# Patient Record
Sex: FEMALE | Race: WHITE | Employment: OTHER | URBAN - METROPOLITAN AREA
[De-identification: names, ages, dates, MRNs, and addresses within clinical notes are randomized per-mention and may not be internally consistent; named-entity substitution may affect disease eponyms.]

---

## 2017-08-15 ENCOUNTER — HOSPITAL ENCOUNTER (OUTPATIENT)
Dept: LAB | Age: 72
Discharge: HOME | End: 2017-08-15

## 2017-08-15 PROCEDURE — 88305 TISSUE EXAM BY PATHOLOGIST: CPT | Performed by: INTERNAL MEDICINE

## 2017-08-16 ENCOUNTER — HOSPITAL ENCOUNTER (OUTPATIENT)
Dept: LAB | Age: 72
Discharge: HOME | End: 2017-08-16

## 2018-12-12 PROBLEM — E04.1 THYROID NODULE: Status: ACTIVE | Noted: 2018-12-12

## 2018-12-14 ENCOUNTER — HOSPITAL ENCOUNTER (EMERGENCY)
Age: 73
Discharge: HOME | End: 2018-12-14
Attending: EMERGENCY MEDICINE
Payer: MEDICARE

## 2018-12-14 VITALS
TEMPERATURE: 98.1 F | RESPIRATION RATE: 20 BRPM | DIASTOLIC BLOOD PRESSURE: 90 MMHG | OXYGEN SATURATION: 99 % | HEART RATE: 95 BPM | SYSTOLIC BLOOD PRESSURE: 145 MMHG

## 2018-12-14 DIAGNOSIS — F41.9 ACUTE ANXIETY: Primary | ICD-10-CM

## 2018-12-14 PROCEDURE — 74011250637 HC RX REV CODE- 250/637: Performed by: EMERGENCY MEDICINE

## 2018-12-14 PROCEDURE — 99284 EMERGENCY DEPT VISIT MOD MDM: CPT | Performed by: EMERGENCY MEDICINE

## 2018-12-14 RX ORDER — LORAZEPAM 0.5 MG/1
0.5 TABLET ORAL
Qty: 11 TAB | Refills: 0 | Status: SHIPPED | OUTPATIENT
Start: 2018-12-14 | End: 2019-07-23 | Stop reason: ALTCHOICE

## 2018-12-14 RX ORDER — LORAZEPAM 0.5 MG/1
0.5 TABLET ORAL
Status: COMPLETED | OUTPATIENT
Start: 2018-12-14 | End: 2018-12-14

## 2018-12-14 RX ADMIN — LORAZEPAM 0.5 MG: 0.5 TABLET ORAL at 14:54

## 2018-12-14 NOTE — ED NOTES
I have reviewed discharge instructions with the patient. The patient verbalized understanding. Patient left ED via Discharge Method: ambulatory to Home with family. Opportunity for questions and clarification provided. Patient given 1 scripts. To continue your aftercare when you leave the hospital, you may receive an automated call from our care team to check in on how you are doing. This is a free service and part of our promise to provide the best care and service to meet your aftercare needs.  If you have questions, or wish to unsubscribe from this service please call [phone]. Thank you for Choosing our [facility].

## 2018-12-14 NOTE — PROGRESS NOTES
Yue Polanco called from [facility] stating their system came back up. Appointment made for Monday 12/17 @ 2:30. Called patient and let her know - states she knows where it is. Suzy OLEA notified.

## 2018-12-14 NOTE — ED TRIAGE NOTES
Pt states she is extremely anxious and hasn't been sleeping at night. Pt states she started having this issue about 5 weeks ago and was put on hydroxyzine jolly 25mg TID as needed. Pt trying to get into psychiatrist. Denies wanting to harm herself. Wants anxiety relief and wants sleep.

## 2018-12-14 NOTE — DISCHARGE INSTRUCTIONS
Take medications as needed  Ativan may make you groggy/drowsy. You should not drive, operate machinery, or partake in any dangerous activities if you have taken Ativan in the last 6 hours  Follow-up with your regular medical doctor  Follow-up with psychiatry as soon as possible    Return to ER for any worsening symptoms or new problems which may arise    Please call Estrella Spangler at [phone] if you have not heard back from [facility] by the end of next week. !

## 2018-12-14 NOTE — ED PROVIDER NOTES
78-year-old female presents with progressive anxiety and depression  Ongoing issue for many weeks. Her family doctor put her on antidepressants as well as Vistaril  She presents today that she feels no better  Family members have attempted to get the patient set up for outpatient psychiatric care. However, they have not been able to establish an appointment for her at this time. The history is provided by the patient and a relative. Anxiety    This is a recurrent problem. The current episode started more than 1 week ago. The problem has been gradually worsening. The problem occurs constantly. The pain is associated with stress. The patient is experiencing no pain. The symptoms are aggravated by stress. Associated symptoms include malaise/fatigue. Pertinent negatives include no abdominal pain, no cough, no fever, no headaches, no palpitations, no shortness of breath and no vomiting. Treatments tried: Vistaril. The treatment provided no relief. Insomnia    Pertinent negatives include no confusion. Past Medical History:   Diagnosis Date    Anxiety     Diabetes [facility])        Past Surgical History:   Procedure Laterality Date    HX COLONOSCOPY      HX ENDOSCOPY           History reviewed. No pertinent family history.     Social History     Socioeconomic History    Marital status:      Spouse name: Not on file    Number of children: Not on file    Years of education: Not on file    Highest education level: Not on file   Social Needs    Financial resource strain: Not on file    Food insecurity - worry: Not on file    Food insecurity - inability: Not on file    Transportation needs - medical: Not on file   [employer] needs - non-medical: Not on file   Occupational History    Not on file   Tobacco Use    Smoking status: Never Smoker    Smokeless tobacco: Never Used   Substance and Sexual Activity    Alcohol use: Not on file    Drug use: Not on file    Sexual activity: Not on file   Other Topics Concern    Not on file   Social History Narrative    Not on file         ALLERGIES: Patient has no known allergies. Review of Systems   Constitutional: Positive for fatigue and malaise/fatigue. Negative for chills and fever. HENT: Negative for congestion, ear pain and rhinorrhea. Eyes: Negative for photophobia and discharge. Respiratory: Negative for cough and shortness of breath. Cardiovascular: Negative for chest pain and palpitations. Gastrointestinal: Negative for abdominal pain, constipation, diarrhea and vomiting. Endocrine: Negative for cold intolerance and heat intolerance. Genitourinary: Negative for dysuria and flank pain. Musculoskeletal: Negative for arthralgias, myalgias and neck pain. Skin: Negative for rash and wound. Allergic/Immunologic: Negative for environmental allergies and food allergies. Neurological: Negative for syncope and headaches. Hematological: Negative for adenopathy. Does not bruise/bleed easily. Psychiatric/Behavioral: Positive for dysphoric mood. Negative for behavioral problems and confusion. The patient is nervous/anxious and has insomnia. All other systems reviewed and are negative. Vitals:    12/14/18 1313   BP: (!) 156/91   Pulse: (!) 108   Resp: 20   Temp: 97.9 °F (36.6 °C)   SpO2: 97%            Physical Exam   Constitutional: She is oriented to person, place, and time. She appears well-developed and well-nourished. She appears distressed. HENT:   Head: Normocephalic and atraumatic. Mouth/Throat: Oropharynx is clear and moist. No oropharyngeal exudate. Eyes: Conjunctivae and EOM are normal. Pupils are equal, round, and reactive to light. Neck: Normal range of motion. Neck supple. No JVD present. Cardiovascular: Normal rate, regular rhythm, normal heart sounds and intact distal pulses. Exam reveals no gallop and no friction rub. No murmur heard.   Pulmonary/Chest: Effort normal and breath sounds normal. Abdominal: Soft. Normal appearance and bowel sounds are normal. She exhibits no distension and no mass. There is no hepatosplenomegaly. There is no tenderness. Musculoskeletal: Normal range of motion. She exhibits no edema or deformity. Neurological: She is alert and oriented to person, place, and time. She has normal strength. No cranial nerve deficit or sensory deficit. She displays a negative Romberg sign. Gait normal.   Skin: Skin is warm and dry. Capillary refill takes less than 2 seconds. No rash noted. Psychiatric: Her speech is normal and behavior is normal. Judgment and thought content normal. Her mood appears anxious. Cognition and memory are normal.   Nursing note and vitals reviewed. MDM  Number of Diagnoses or Management Options  Acute anxiety: established and worsening  Diagnosis management comments: 78-year-old female with anxiety and depression  No suicidal or homicidal ideations  I will start the patient on a low-dose of Ativan. We will begin the process of a referral to [facility] psychiatry  Patient and family are strongly encouraged to pursue their other referrals as well, so that she can establish with a regular psychiatrist.           Amount and/or Complexity of Data Reviewed  Tests in the medicine section of CPT®: ordered and reviewed  Review and summarize past medical records: yes    Risk of Complications, Morbidity, and/or Mortality  Presenting problems: moderate  Diagnostic procedures: minimal  Management options: low  General comments: Elements of this note have been dictated via voice recognition software. Text and phrases may be limited by the accuracy of the software. The chart has been reviewed, but errors may still be present.       Patient Progress  Patient progress: stable         Procedures

## 2018-12-17 ENCOUNTER — HOSPITAL ENCOUNTER (OUTPATIENT)
Dept: ULTRASOUND IMAGING | Age: 73
Discharge: HOME | End: 2018-12-17
Attending: SURGERY
Payer: MEDICARE

## 2018-12-17 VITALS
SYSTOLIC BLOOD PRESSURE: 161 MMHG | RESPIRATION RATE: 16 BRPM | OXYGEN SATURATION: 97 % | HEART RATE: 107 BPM | DIASTOLIC BLOOD PRESSURE: 75 MMHG

## 2018-12-17 DIAGNOSIS — E04.1 THYROID NODULE: ICD-10-CM

## 2018-12-17 PROCEDURE — 88173 CYTOPATH EVAL FNA REPORT: CPT

## 2018-12-17 PROCEDURE — 10022 US GUIDE FINE NDL ASP THYROID: CPT

## 2018-12-17 PROCEDURE — 88305 TISSUE EXAM BY PATHOLOGIST: CPT

## 2018-12-17 PROCEDURE — 74011250636 HC RX REV CODE- 250/636: Performed by: PHYSICIAN ASSISTANT

## 2018-12-17 RX ORDER — LIDOCAINE HYDROCHLORIDE 20 MG/ML
20-200 INJECTION, SOLUTION EPIDURAL; INFILTRATION; INTRACAUDAL; PERINEURAL ONCE
Status: COMPLETED | OUTPATIENT
Start: 2018-12-17 | End: 2018-12-17

## 2018-12-17 RX ADMIN — LIDOCAINE HYDROCHLORIDE 40 MG: 20 INJECTION, SOLUTION EPIDURAL; INFILTRATION; INTRACAUDAL; PERINEURAL at 12:55

## 2018-12-17 NOTE — PROGRESS NOTES
Pt in [address] for procedure; pt hungry, given crackers and peanut butter prior to procedure.

## 2018-12-17 NOTE — DISCHARGE INSTRUCTIONS
[address]  Department of Interventional Radiology  [facility]  [phone] Office  [phone] Fax    BIOPSY DISCHARGE INSTRUCTIONS    General Instructions:     A biopsy is the removal of a small piece of tissue for microscopic examination or testing. Healthy tissue can be obtained for the purpose of tissue-type matching for transplants. Unhealthy tissues are more commonly biopsied to diagnose disease. General Biopsy:     A mass can grow in any area of the body, and we are taking a specimen as ordered by your doctor. The risks are the same. They include bleeding, pain, and infection. Home Care Instructions: You may resume your regular diet and medication regimen. Do not lift anything heavier than a gallon of milk until the soreness goes away. You may use over the counter acetaminophen or ibuprofen for the soreness. You may apply an ice pack to the affected area for 20-30 minutes at time for the first 24 hours. After that, you may apply a heat pack. Call If: You should call your Physician and/or the Radiology Nurse if you have any questions or concerns about the biopsy site. Call if you should have increased pain, fever, redness, drainage, or bleeding more than a small spot on the bandage. Follow-Up Instructions: Please see your ordering doctor as he/she has requested. To Reach Us: If you have any questions about your procedure, please call the Interventional Radiology department at [phone]. After business hours (5pm) and weekends, call the answering service at [phone] and ask for the Radiologist on call to be paged. Interventional Radiology General Nurse Discharge    * Please give a list of your current medications to your Primary Care Provider.   * Please update this list whenever your medications are discontinued, doses are     changed, or new medications (including over-the-counter products) are added.  * Please carry medication information at all times in case of emergency situations. These are general instructions for a healthy lifestyle:    No smoking/ No tobacco products/ Avoid exposure to second hand smoke  Surgeon General's Warning:  Quitting smoking now greatly reduces serious risk to your health. Obesity, smoking, and sedentary lifestyle greatly increases your risk for illness  A healthy diet, regular physical exercise & weight monitoring are important for maintaining a healthy lifestyle    You may be retaining fluid if you have a history of heart failure or if you experience any of the following symptoms:  Weight gain of 3 pounds or more overnight or 5 pounds in a week, increased swelling in our hands or feet or shortness of breath while lying flat in bed. Please call your doctor as soon as you notice any of these symptoms; do not wait until your next office visit. Recognize signs and symptoms of STROKE:  F-face looks uneven    A-arms unable to move or move unevenly    S-speech slurred or non-existent    T-time-call 911 as soon as signs and symptoms begin-DO NOT go       Back to bed or wait to see if you get better-TIME IS BRAIN.           Patient Signature:  Date: 12/17/2018  Discharging Nurse: Naun Soto

## 2018-12-20 NOTE — PROGRESS NOTES
Patient called stating she would like to go to [facility].   Email communication with Silvana Umanzor at [facility] to let her know and records faxed for referral.

## 2019-01-08 PROBLEM — E04.1 THYROID NODULE: Status: RESOLVED | Noted: 2018-12-12 | Resolved: 2019-01-08

## 2019-12-27 PROBLEM — R00.2 PALPITATIONS: Status: ACTIVE | Noted: 2019-12-27

## 2019-12-27 PROBLEM — E11.9 CONTROLLED TYPE 2 DIABETES MELLITUS WITHOUT COMPLICATION, WITHOUT LONG-TERM CURRENT USE OF INSULIN (HCC): Status: ACTIVE | Noted: 2019-12-27

## 2020-01-17 PROBLEM — I47.1 SVT (SUPRAVENTRICULAR TACHYCARDIA) (HCC): Status: ACTIVE | Noted: 2020-01-17

## 2020-11-12 ENCOUNTER — HOSPITAL ENCOUNTER (OUTPATIENT)
Dept: LAB | Age: 75
Discharge: HOME | End: 2020-11-12

## 2020-11-12 PROCEDURE — 88305 TISSUE EXAM BY PATHOLOGIST: CPT

## 2022-03-18 PROBLEM — R00.2 PALPITATIONS: Status: ACTIVE | Noted: 2019-12-27

## 2022-03-18 PROBLEM — E11.9 CONTROLLED TYPE 2 DIABETES MELLITUS WITHOUT COMPLICATION, WITHOUT LONG-TERM CURRENT USE OF INSULIN (HCC): Status: ACTIVE | Noted: 2019-12-27

## 2022-03-19 PROBLEM — I47.1 SVT (SUPRAVENTRICULAR TACHYCARDIA) (HCC): Status: ACTIVE | Noted: 2020-01-17

## 2022-03-19 PROBLEM — I47.10 SVT (SUPRAVENTRICULAR TACHYCARDIA): Status: ACTIVE | Noted: 2020-01-17

## 2022-07-08 ENCOUNTER — OFFICE VISIT (OUTPATIENT)
Dept: ORTHOPEDIC SURGERY | Age: 77
End: 2022-07-08
Payer: MEDICARE

## 2022-07-08 VITALS — HEIGHT: 66 IN | BODY MASS INDEX: 27.97 KG/M2 | WEIGHT: 174 LBS

## 2022-07-08 DIAGNOSIS — M25.511 BILATERAL SHOULDER PAIN, UNSPECIFIED CHRONICITY: Primary | ICD-10-CM

## 2022-07-08 DIAGNOSIS — M25.512 BILATERAL SHOULDER PAIN, UNSPECIFIED CHRONICITY: Primary | ICD-10-CM

## 2022-07-08 DIAGNOSIS — M12.811 ROTATOR CUFF ARTHROPATHY OF BOTH SHOULDERS: ICD-10-CM

## 2022-07-08 DIAGNOSIS — M12.812 ROTATOR CUFF ARTHROPATHY OF BOTH SHOULDERS: ICD-10-CM

## 2022-07-08 RX ORDER — METHYLPREDNISOLONE ACETATE 40 MG/ML
40 INJECTION, SUSPENSION INTRA-ARTICULAR; INTRALESIONAL; INTRAMUSCULAR; SOFT TISSUE ONCE
Status: COMPLETED | OUTPATIENT
Start: 2022-07-08 | End: 2022-07-08

## 2022-07-08 RX ORDER — OMEPRAZOLE 20 MG/1
CAPSULE, DELAYED RELEASE ORAL
COMMUNITY
Start: 2022-05-13

## 2022-07-08 RX ADMIN — METHYLPREDNISOLONE ACETATE 80 MG: 40 INJECTION, SUSPENSION INTRA-ARTICULAR; INTRALESIONAL; INTRAMUSCULAR; SOFT TISSUE at 16:03

## 2022-07-08 NOTE — PROGRESS NOTES
Patient was fitted and instructed on a Silicone Toe Sleeve for the right foot. Patient read and signed documenting they understand and agree to [facility]'s current DME return policy.

## 2022-07-08 NOTE — PROGRESS NOTES
Name: Shahida Ireland  YOB: 1945  Gender: female  MRN: [id number]    CC: Right and Left shoulder pain: Back pain: Second toe: Foot fungus     HPI:   She reports going upstairs, tripping and bracing herself with her arms extended. From that point she noticed pain in her shoulders/arms    02/18/2022: Shoulder injections really helped her pain  05/20/2022: Last visit with me. She did really well and reports no glucose concerns   07/08/2022: She presents early to discuss several concerns. 1.  Chronic low back pain. She reports having injections by Dr. Marti Stephenson  2. Foot fungus  3. Right second claw toe  4. Recurrent shoulder pain     ROS/Meds/PSH/PMH/FH/SH: reviewed today    Tobacco:  reports that she has quit smoking. She has never used smokeless tobacco.      Physical Examination:  Patient appears to be alert and oriented with acceptable appearance.   No obvious distress or SOB  CV: appears to have acceptable vascular color and capillary refill  Neuro: appears to have mostly intact light touch sensation   Skin: No soft tissue swelling or bruising: Second toe PIP thickening but no ulceration or redness; thick nails but no web-space moister   MS: Standing: Plantigrade: Gait full: Right second claw toe  Neck = no localizing signs of neck pain    Right and left shoulders/arms = exam consistent with subacromial impingement/rotator cuff; she has full motion of her arms without any pain as long as she does not abduct beyond 80 or into extreme forward flexion or extension  Right and left shoulders/arms = no evidence of biceps tearing; 5/5 strength; good shoulder elbow wrist and finger motion; no gross instability     XR: Left and right humerus taken 02/18/2022 with no acute pathology appreciated; AC joint arthritis;  XR Impression:  As above       XR: Lumbosacral spine films with spot lumbosacral film taken 02/18/2022 with degenerative arthritis; facet arthritis; mild spondylolisthesis; no acute pathology; linear calcifications suspected to be within the aorta  XR Impression:  As above      Injection: We discussed the risks and complications of the procedure and the decision was made to proceed; after sterile prep, EACH right and left shoulder was injected with 2 cc Xylocaine: 80 mg Depo-Medrol: she understands how the Depomedrol may affect glucose control. She desired to proceed with the injection and agrees to monitor, treat and control the glucose level; She did well after the injection     Assessment:     Right shoulder pain, rotator cuff arthropathy, glenohumeral arthritis  Left shoulder pain, rotator cuff arthropathy, glenohumeral arthritis  Right second claw toe  Chronic low back pain under the care of Dr. Cherylene Landau:   The patient and I discussed the above assessment. We explored treatment options. She had questions about her low back pain, but I recommend she direct all spine related questions to Dr. Cynthia Abreu  She had questions about foot fungus and I recommend she seek dermatologic treatment   She had questions about her right second toe claw toe. She was issued a toes sleeve but we discussed future claw toe surgery  She received excellent results after her last shoulder injection and requested another injection today. She is early for repeat injections but I did consent to repeat injections.         Advanced medical imaging: Future possible MRI scan right shoulder: Left shoulder  Future possible consultation: Dr. Imelda Wayne for left shoulder and right shoulder     We discussed shoulder/arm care and protection      Medication - OTC meds prn; as discussed prior prescribed:  Magne sports topical rub with frankincense and myrrh   Alpha Lipoic acid, Boswellia, Devil's claw, Turmeric-curcumin        Surgical discussion: Surgery per Dr. Imelda Wayne   Follow up: Dr. Imelda Wayne: Dr. Cynthia Abreu    Work status: Limited     This note was created using Dragon voice recognition software which may result in errors of speech and spelling recognition and word/phrase syntax errors.

## 2022-07-08 NOTE — LETTER
DME Patient Authorization Form    Name: Nancy Jiang  :   MRN: [id number]   Age: 77 y.o. Gender: female  Delivery Address: [facility]     Diagnosis:     ICD-10-CM    1. Bilateral shoulder pain, unspecified chronicity  C85.781 Silicone Toe Sleeve ()    M25.512         Requested DME:  Silicone Toe Sleeve -  ($5) X 1 - right        Clinical Notes:     **Indicates non-covered items by insurance. Payment expected on date of service. Electronically signed by  Provider: __Sergey Cortes MD_____________________________ Date: 2022                             [facility] Tax ID # [id number]        Durable Medical Equipment and/or Orthotics Patient Consent     I understand that my physician has prescribed this medical supply as part of my treatment plan as a matter of Medical Necessity.  I understand that I have a choice in where I receive my prescribed orthopedic supplies and/or services.  I authorize [facility] to furnish this service/product and to provide my insurance carrier with any information requested in order to process for payment.  I instruct my insurance carrier to pay [facility] directly for these services/products.  I understand that my insurance carrier may deny payment for this supply because it is a non-covered item, deemed not medically necessary or considered experimental.   I understand that any cost not covered by my insurance carrier will be solely my financial responsibility.  I have received the Supplier Standards and have reviewed them.  I have received the prescribed item and have been fully instructed on the proper use of the above services/products.    ______ (Patient Initials) I understand that all DME items are non-returnable after being dispensed.  Items still in sealed packaging may be returned up to 14 days after purchasing. [address] will replace items that are defective.    ______ (Patient Initials) I understand that [facility] Haley will not file a claim with my insurance carrier for this service/product and I am waiving my right to file a claim on my own for this service/product with my insurance company as this item is NON-COVERED (Denoted by the **) by my Insurance company/policy. ______ (Patient Initials) I understand that I am responsible to bring my equipment to the hospital for any surgery. ______________________________________________  ________________________  Patient / Carmen Prime            Thank you for considering [address] Your physician has prescribed specific medical equipment or devices for your home use. The following describes your rights and responsibilities as our customer. Right to Choose Providers: You have a choice regarding which company supplies your home medical equipment and devices, and to consult your physician in this decision. You may choose a medical supply store, a home medical equipment provider, or a specialist such as [facility]. [facility] will coordinate with your physician to provide the medical equipment or devices prescribed for your home use. Right to Service:  You have the right to considerate, respectful and nondiscriminatory care. You have the right to receive accurate and easily understood information about your health care. If you speak a foreign language, or don't understand the discussions, assistance will be provided to allow you to make informed health care decisions. You have the right to know your treatment options and to participate in decisions about your care, including the right to accept or refuse treatment.   You have the right to expect a reasonable response to your requests for treatment or service. You have the right to talk in confidence with health care providers and to have your health care information protected. You have the right to receive an explanation of your bill. You have the right to complain about the service or product you receive. Patient Responsibilities:  Please provide complete and accurate information about your health insurance benefits and make arrangements for the timely payment of your bill. [facility] will, if possible, assume responsibility for billing your insurance (Medicare, Medicaid and commercial) for the prescribed equipment or devices. If your policy does not cover the prescribed product, or only covers a portion of the bill, you are responsible for any remaining balance. Return and Exchange Policy:  [facility] will honor published  Warranties for products. [facility] will accept returns or exchanges within 14 days from the date of receipt, providin) the product must be in new condition; 2) receipt as required; and 3) used disposable and hygiene products may only be returned due to a defective product. Note: Refunds will be issued in a timely manner, please allow 4-6 weeks for processing. Complaint Procedures and DME Consumer Protection Resources:  [facility] values you as a customer, and is committed to resolving patient concerns. This commitment includes understanding and documenting your concerns, conducting a review of internal procedures, and providing you with an explanation and resolution to your concerns. Should you have any questions about our services or billing process, please contact our office at (practice phone number). If we are unable to resolve the concern, you have the right to direct comments to the office of Consumer Protection, in the [address] or the [facility] office, without fear of repercussion.     DMEPOS SUPPLIER STANDARDS    A supplier must be in compliance with all applicable Federal and State licensure and regulatory requirements. A supplier must provide complete and accurate information on the DMEPOS supplier application. Any changes to this information must be reported to the [facility] within 30 days. An authorized individual (one whose signature is binding) must sign the application for billing privileges. A supplier must fill orders from its own inventory, or must contract with other companies for the purchase of items necessary to fill the order. A supplier may not contract with any entity that is currently excluded from the Medicare program, any [facility] program, or from any other Federal procurement or Nonprocurement programs. A supplier must advise beneficiaries that they may rent or purchase inexpensive or routinely purchased durable medical equipment, and of the purchase option for capped rental equipment. A supplier must notify beneficiaries of warranty coverage and honor all warranties under applicable State Law, and repair or replace free of charge Medicare covered items that are under warranty. A supplier must maintain a physical facility on an appropriate site. A supplier must permit CMS, or its agents to conduct on-site inspections to ascertain the supplier's compliance with these standards. The supplier location must be accessible to beneficiaries during reasonable business hours, and must maintain a visible sign and posted hours of operation. A supplier must maintain a primary business telephone listed under the name of the business in a [employer] or a toll free number available through directory assistance. The exclusive use of a beeper, answering machine or cell phone is prohibited. A supplier must have comprehensive liability insurance in the amount of at least $300,000 that covers both the supplier's place of business and all customers and employees of the supplier.   If the supplier manufactures its own items, this insurance must also cover product liability and completed operations. A supplier must agree not to initiate telephone contact with beneficiaries, with a few exceptions allowed. This standard prohibits suppliers from calling beneficiaries in order to solicit new business. A supplier is responsible for delivery and must instruct beneficiaries on use of Medicare covered items, and maintain proof of delivery. A supplier must answer questions, and respond to complaints of the beneficiaries, and maintain documentation of such contacts. A supplier must maintain and replace at no charge or repair directly, or through a service contract with another company, Medicare covered items it has rented to beneficiaries. A supplier must accept returns of substandard (less than full quality for the particular item) or unsuitable items (inappropriate for the beneficiary at the time it was fitted and rented or sold) from beneficiaries. A supplier must disclose these supplier standards to each beneficiary to whom it supplies a Medicare-covered item. A supplier must disclose to the government any person having ownership, financial, or control interest in the supplier. A supplier must not convey or reassign a supplier number; i.e., the supplier may not sell or allow another entity to use its Medicare billing number. A supplier must have a complaint resolution protocol established to address beneficiary complaints that relate to these standards. A record of these complaints must be maintained at the physical facility. Complaint records must include: the name, address, telephone number and health insurance claim number of the beneficiary, a summary of the complaint, and any action taken to resolve it. A supplier must agree to furnish CMS any information required by the Medicare statute and implementing regulations.   A supplier of DMEPOS and other items and services must be accredited by a CMS-approved accreditation organization in order to receive and retain a supplier billing number. The accreditation must indicate the specific products and services, for which the supplier is accredited in order for the supplier to receive payment for those specific products and services. A DMEPOS supplier must notify their accreditation organization when a new DMEPOS location is opened. The accreditation organization may accredit the new supplier location for three months after it is operational without requiring a new site visit. All DMEPOS supplier locations, whether owned or subcontracted, must meet the [employer] and be separately accredited in order to bill Medicare. An accredited supplier may be denied enrollment or their enrollment may be revoked, if CMS determines that they are not in compliance with the DMEPOS quality standards. A DMEPOS supplier must disclose upon enrollment all products and services, including the addition of new product lines for which they are seeking accreditation. If a new product line is added after enrollment, the DMEPOS supplier will be responsible for notifying the accrediting body of the new product so that the DMEPOS supplier can be re-surveyed and accredited for these new products. Must meet the surety bond requirements specified in 42 C. F.R. 424.57(c). Implementation date- May 4, 2009. A supplier must obtain oxygen from a state-licensed oxygen supplier. A supplier must maintain ordering and referring documentation consistent with provisions found in 42 C. F.R. 424.516(f). DMEPOS suppliers are prohibited from sharing a practice location with certain other Medicare providers and suppliers. DMEPOS suppliers must remain open to the public for a minimum of 30 hours per week with certain exceptions.

## 2022-09-01 ENCOUNTER — TELEPHONE (OUTPATIENT)
Dept: ORTHOPEDIC SURGERY | Age: 77
End: 2022-09-01

## 2022-09-01 NOTE — TELEPHONE ENCOUNTER
Called patient and left message for her to return my call and schedule repeat injections with [facility]

## 2022-09-19 ENCOUNTER — TELEPHONE (OUTPATIENT)
Dept: CARDIOLOGY CLINIC | Age: 77
End: 2022-09-19

## 2022-09-19 DIAGNOSIS — R00.2 HEART PALPITATIONS: Primary | ICD-10-CM

## 2022-09-19 NOTE — TELEPHONE ENCOUNTER
Patient called and said her heart has been beating irregularly for about one month. Patient said she does not have any pain, no tightness, no short of breath and no sweats. Patient said her heart would be beating and then do a hard beat and go on. She said she knows it sounds strange but sometimes it seems to be brought on by hunger. Patient is getting concerned with this coming on everyday for about a month. Her home number where she will be today is [phone].

## 2022-09-19 NOTE — TELEPHONE ENCOUNTER
Having some hard/irreg. beats and takes a Metoprolol when this happens. Has been having these episodes off/on for a month. Saw PCP last week and her EKG was ok. She said irreg. beats happen almost daily. Wonders if she may benefit from heart monitor then a fu appt. ?

## 2022-09-20 NOTE — TELEPHONE ENCOUNTER
Elaine Felipe MD  You Just now (9:06 AM)     [address], then cont daily toprol and check a 3 day holter prior to visit. I called and informed pt. of MD response and placed order for holter and made fu for 10/28 w/.

## 2022-09-20 NOTE — TELEPHONE ENCOUNTER
I spoke w/pt. told her MD response. She then clarified that she does take Toprol XL daily and then she takes an extra dose when she has an episode of irreg. beats. This has been more frequent. Please offer advice. Kartik Herring

## 2022-10-28 ENCOUNTER — OFFICE VISIT (OUTPATIENT)
Dept: CARDIOLOGY CLINIC | Age: 77
End: 2022-10-28
Payer: MEDICARE

## 2022-10-28 VITALS
SYSTOLIC BLOOD PRESSURE: 120 MMHG | HEIGHT: 66 IN | WEIGHT: 167.7 LBS | BODY MASS INDEX: 26.95 KG/M2 | DIASTOLIC BLOOD PRESSURE: 70 MMHG | HEART RATE: 88 BPM

## 2022-10-28 DIAGNOSIS — I47.1 SUPRAVENTRICULAR TACHYCARDIA (HCC): ICD-10-CM

## 2022-10-28 DIAGNOSIS — R53.81 MALAISE AND FATIGUE: ICD-10-CM

## 2022-10-28 DIAGNOSIS — R53.83 MALAISE AND FATIGUE: ICD-10-CM

## 2022-10-28 PROBLEM — R00.2 PALPITATION: Status: ACTIVE | Noted: 2019-12-27

## 2022-10-28 PROCEDURE — G8428 CUR MEDS NOT DOCUMENT: HCPCS | Performed by: INTERNAL MEDICINE

## 2022-10-28 PROCEDURE — 1123F ACP DISCUSS/DSCN MKR DOCD: CPT | Performed by: INTERNAL MEDICINE

## 2022-10-28 PROCEDURE — 1036F TOBACCO NON-USER: CPT | Performed by: INTERNAL MEDICINE

## 2022-10-28 PROCEDURE — 1090F PRES/ABSN URINE INCON ASSESS: CPT | Performed by: INTERNAL MEDICINE

## 2022-10-28 PROCEDURE — G8400 PT W/DXA NO RESULTS DOC: HCPCS | Performed by: INTERNAL MEDICINE

## 2022-10-28 PROCEDURE — 99214 OFFICE O/P EST MOD 30 MIN: CPT | Performed by: INTERNAL MEDICINE

## 2022-10-28 PROCEDURE — G8484 FLU IMMUNIZE NO ADMIN: HCPCS | Performed by: INTERNAL MEDICINE

## 2022-10-28 PROCEDURE — G8417 CALC BMI ABV UP PARAM F/U: HCPCS | Performed by: INTERNAL MEDICINE

## 2022-10-28 NOTE — PROGRESS NOTES
[facility]  [address], [address] [facility], [address] [place] [address]  PHONE: [phone]        10/30/22        NAME:  Mamadou Maria  : 1945  MRN: [id number]     CHIEF COMPLAINT:    Palpitations         SUBJECTIVE:     Occ palpitation. Main c/o is exhaustion. She awakens in the AM tired. She notes xs daytime somnolence. Medications were all reviewed with the patient today and updated as necessary. Current Outpatient Medications   Medication Sig    omeprazole (PRILOSEC) 20 MG delayed release capsule     DULoxetine (CYMBALTA) 20 MG extended release capsule Take 1 capsule by mouth    glipiZIDE (GLUCOTROL) 5 MG tablet Take 1 tablet by mouth daily    metFORMIN (GLUCOPHAGE-XR) 500 MG extended release tablet Take 500 mg by mouth daily    metoprolol succinate (TOPROL XL) 25 MG extended release tablet Take 25 mg by mouth daily    rosuvastatin (CRESTOR) 10 MG tablet Take 10 mg by mouth every other day    TRAZODONE HCL PO Take 25 mg by mouth     No current facility-administered medications for this visit. Allergies   Allergen Reactions    Gabapentin Other (See Comments)     Jerking    Tizanidine Other (See Comments)     Neck jerks  Other reaction(s): Other- (not listed) - Allergy  Neck jerks           PHYSICAL EXAM:     Wt Readings from Last 3 Encounters:   10/28/22 167 lb 11.2 oz (76.1 kg)   22 174 lb (78.9 kg)   22 174 lb (78.9 kg)     BP Readings from Last 3 Encounters:   10/28/22 120/70   22 126/80   21 132/80       /70   Pulse 88   Ht 5' 6\" (1.676 m)   Wt 167 lb 11.2 oz (76.1 kg)   BMI 27.07 kg/m²     Physical Exam  Vitals reviewed. HENT:      Head: Normocephalic and atraumatic. Eyes:      Extraocular Movements: Extraocular movements intact. Pupils: Pupils are equal, round, and reactive to light. Cardiovascular:      Rate and Rhythm: Normal rate. Heart sounds: Normal heart sounds.    Pulmonary:      Effort: Pulmonary effort is normal.      Breath sounds: Normal breath sounds. Abdominal:      General: Abdomen is flat. Palpations: Abdomen is soft. There is no mass. Musculoskeletal:         General: Normal range of motion. Cervical back: Normal range of motion. Skin:     General: Skin is warm and dry. Neurological:      General: No focal deficit present. Mental Status: She is alert and oriented to person, place, and time. Psychiatric:         Mood and Affect: Mood normal.         RECENT LABS AND RECORDS REVIEW    Holter unremarkable      ASSESSMENT and Vitor Pretty was seen today for palpitations. Diagnoses and all orders for this visit:    Malaise and fatigue  -     Nuclear stress test with myocardial perfusion; Future    Supraventricular tachycardia (HCC)     CV status is stable. She will talk w/ her Pcp about ALBERTO testing    Medications and most recent labs reviewed. Diet and exercise are encouraged. Greater  than 50% of today's visit was devoted to counseling the patient, explaining disease concepts and offering advice and suggestions for optimal care. Return in about 6 months (around 4/28/2023).        Patricio Spencer MD  10/30/2022  9:12 PM

## 2022-11-21 ENCOUNTER — OFFICE VISIT (OUTPATIENT)
Dept: ORTHOPEDIC SURGERY | Age: 77
End: 2022-11-21
Payer: MEDICARE

## 2022-11-21 VITALS — HEIGHT: 66 IN | WEIGHT: 174 LBS | BODY MASS INDEX: 27.97 KG/M2

## 2022-11-21 DIAGNOSIS — M25.511 BILATERAL SHOULDER PAIN, UNSPECIFIED CHRONICITY: Primary | ICD-10-CM

## 2022-11-21 DIAGNOSIS — M19.012 ARTHRITIS OF BOTH SHOULDER REGIONS: ICD-10-CM

## 2022-11-21 DIAGNOSIS — M19.011 ARTHRITIS OF BOTH SHOULDER REGIONS: ICD-10-CM

## 2022-11-21 DIAGNOSIS — M12.812 ROTATOR CUFF ARTHROPATHY OF BOTH SHOULDERS: ICD-10-CM

## 2022-11-21 DIAGNOSIS — M12.811 ROTATOR CUFF ARTHROPATHY OF BOTH SHOULDERS: ICD-10-CM

## 2022-11-21 DIAGNOSIS — M25.512 BILATERAL SHOULDER PAIN, UNSPECIFIED CHRONICITY: Primary | ICD-10-CM

## 2022-11-21 PROCEDURE — 20610 DRAIN/INJ JOINT/BURSA W/O US: CPT | Performed by: ORTHOPAEDIC SURGERY

## 2022-11-21 RX ORDER — METHYLPREDNISOLONE ACETATE 40 MG/ML
40 INJECTION, SUSPENSION INTRA-ARTICULAR; INTRALESIONAL; INTRAMUSCULAR; SOFT TISSUE ONCE
Status: COMPLETED | OUTPATIENT
Start: 2022-11-21 | End: 2022-11-21

## 2022-11-21 RX ADMIN — METHYLPREDNISOLONE ACETATE 80 MG: 40 INJECTION, SUSPENSION INTRA-ARTICULAR; INTRALESIONAL; INTRAMUSCULAR; SOFT TISSUE at 09:22

## 2022-11-21 RX ADMIN — METHYLPREDNISOLONE ACETATE 80 MG: 40 INJECTION, SUSPENSION INTRA-ARTICULAR; INTRALESIONAL; INTRAMUSCULAR; SOFT TISSUE at 09:23

## 2022-11-21 NOTE — PROGRESS NOTES
Name: Mervat Barillas  YOB: 1945  Gender: female  MRN: [id number]    CC: Right and Left shoulder pain      HPI:   02/18/2022: Shoulder injections helped her pain  05/20/2022: She did really well with injections with no glucose concerns   07/08/2022: She presented to discuss several concerns. 1.  Chronic low back pain. She reports having injections by Dr. Calvin Chandler  2. Foot fungus  3. Right second claw toe  4. Recurrent shoulder pain    11/21/2022: She presents with her recurrent bilateral shoulder pain after increased activity     ROS/Meds/PSH/PMH/FH/SH: reviewed today    Tobacco:  reports that she has quit smoking. She has never used smokeless tobacco.      Physical Examination:  Patient appears to be alert and oriented with acceptable appearance. No obvious distress or SOB  CV: appears to have acceptable vascular color and capillary refill  Neuro: appears to have mostly intact light touch sensation   Skin: No soft tissue swelling  MS: Standing: Plantigrade: Gait full  Neck = no localizing signs of neck pain    Right: Left = subacromial impingement/rotator cuff shoulder limitations  Right: Left = no evidence of biceps tearing; 5/5 strength; no gross instability     XR: Left: Right humerus taken 02/18/2022 with no acute pathology appreciated; AC joint arthritis;  XR Impression:  As above       XR: Lumbosacral spine films with spot lumbosacral film taken 02/18/2022 with degenerative arthritis; facet arthritis; mild spondylolisthesis; no acute pathology; linear calcifications suspected to be within the aorta  XR Impression:  As above      Injection: We discussed the risks and complications of the procedure and the decision was made to proceed; after sterile prep, EACH RIGHT LEFT shoulder joint was injected with 2 cc Xylocaine: 80 mg Depo-Medrol: she understands how the Depomedrol may affect glucose control. She desired to proceed with the injections.   She reports no prior problems     Assessment: Right shoulder pain, rotator cuff arthropathy, glenohumeral arthritis  Left shoulder pain, rotator cuff arthropathy, glenohumeral arthritis  Right second claw toe  Chronic low back pain under the care of Dr. Rafi Chiang:   The patient and I discussed the above assessment. We explored treatment options. We discussed her recurrent shoulder pain. At some point, she needs to consider TSA, but she is not interested in surgery at this time  She feels that she overdid her shoulder work causing a flare of pain      Advanced medical imaging: Future possible MRI scan versus CT scan: Right shoulder: Left shoulder she understands importance of shoulder/arm care and protection   Medication - OTC meds prn;       Surgical discussion: We discussed TSA surgery per Dr. Abiel Mcgee, but she is not interested in surgical treatment at this time  Follow up: Discussed sustained Dr. Abiel Mcgee: Pending spine injections with Dr. Fazal Bernal    Work status: Limited     This note was created using Dragon voice recognition software which may result in errors of speech and spelling recognition and word/phrase syntax errors.

## 2022-12-05 ENCOUNTER — TELEPHONE (OUTPATIENT)
Dept: ORTHOPEDIC SURGERY | Age: 77
End: 2022-12-05

## 2022-12-06 ENCOUNTER — OFFICE VISIT (OUTPATIENT)
Dept: ORTHOPEDIC SURGERY | Age: 77
End: 2022-12-06
Payer: MEDICARE

## 2022-12-06 DIAGNOSIS — M54.50 LUMBAR BACK PAIN: ICD-10-CM

## 2022-12-06 DIAGNOSIS — M47.816 SPONDYLOSIS OF LUMBAR REGION WITHOUT MYELOPATHY OR RADICULOPATHY: Primary | ICD-10-CM

## 2022-12-06 DIAGNOSIS — M48.061 SPINAL STENOSIS OF LUMBAR REGION WITHOUT NEUROGENIC CLAUDICATION: ICD-10-CM

## 2022-12-06 DIAGNOSIS — M54.16 LUMBAR RADICULOPATHY: ICD-10-CM

## 2022-12-06 PROCEDURE — 1090F PRES/ABSN URINE INCON ASSESS: CPT | Performed by: PHYSICAL MEDICINE & REHABILITATION

## 2022-12-06 PROCEDURE — 99213 OFFICE O/P EST LOW 20 MIN: CPT | Performed by: PHYSICAL MEDICINE & REHABILITATION

## 2022-12-06 PROCEDURE — 1123F ACP DISCUSS/DSCN MKR DOCD: CPT | Performed by: PHYSICAL MEDICINE & REHABILITATION

## 2022-12-06 PROCEDURE — G8400 PT W/DXA NO RESULTS DOC: HCPCS | Performed by: PHYSICAL MEDICINE & REHABILITATION

## 2022-12-06 PROCEDURE — 1036F TOBACCO NON-USER: CPT | Performed by: PHYSICAL MEDICINE & REHABILITATION

## 2022-12-06 PROCEDURE — G8484 FLU IMMUNIZE NO ADMIN: HCPCS | Performed by: PHYSICAL MEDICINE & REHABILITATION

## 2022-12-06 PROCEDURE — G8417 CALC BMI ABV UP PARAM F/U: HCPCS | Performed by: PHYSICAL MEDICINE & REHABILITATION

## 2022-12-06 PROCEDURE — G8428 CUR MEDS NOT DOCUMENT: HCPCS | Performed by: PHYSICAL MEDICINE & REHABILITATION

## 2022-12-06 NOTE — PROGRESS NOTES
Date:  12/06/22     HPI:  I am seeing Juana Villalta in evalution/folowup. I saw her most recently over 113 months ago. I saw her in 2016 with a history of lumbar spine pain, lower lumbar paraspinal.  She has a history of fibromyalgia. Also had buttocks pain with some weakness in the legs. MR imaging from June 2020 revealed moderate central spinal stenosis at the L4-L5 level, lesser at L3-L4 level. Facet arthropathy. She has been evaluated by Dr Octavio Etienne in the past and is felt the spine surgery was not indicated. The combination of a translaminar lumbar TARSHA with facet injections have worked very well for her. Those were performed in August of last year and she did very well with the actual pain reduction for over 6 months but developed a severe headache for about a week and a half afterwards. The presumption that this was a spinal headache and fortunately that has all gotten better. She tells me the pain is in the lower lumbar paraspinal areas, typically nonradiating. It may gravitate to the upper buttocks. She has no neurologic issues in the lower extremities. There is no history of a progressive gait disturbance. ROS: Unaware of any new problems    PE: Cooperative. Good strength lower extremities. No lateralizing sensory findings. She has tenderness in lower lumbar paraspinal areas. Assessment/Plan:  PREDOMINANTLY MUSCULOSKELETAL LUMBOSACRAL  SPINE PAIN. In the context of significant spinal stenosis, her gait is stable. I do not think there is a spine surgical issue nor do we need to reimage the lumbar spine. The combination injections have worked well for her but she had a post dural puncture headache presumably at last visit and I would have to be wary of that before perform another translaminar lumbar TARSHA. From discussion today, I think we might get by with just bilateral lumbar facet injections without the TARSHA and see if that does not provide good palliation.   Both parties agree and I will see her in the next day or 2 to perform this.     Other aftercare instructions:  Nothing pertinent today    Edie Morales MD

## 2022-12-07 ENCOUNTER — TELEPHONE (OUTPATIENT)
Dept: ORTHOPEDIC SURGERY | Age: 77
End: 2022-12-07

## 2022-12-07 ENCOUNTER — OFFICE VISIT (OUTPATIENT)
Dept: ORTHOPEDIC SURGERY | Age: 77
End: 2022-12-07
Payer: MEDICARE

## 2022-12-07 DIAGNOSIS — M47.816 SPONDYLOSIS OF LUMBAR REGION WITHOUT MYELOPATHY OR RADICULOPATHY: Primary | ICD-10-CM

## 2022-12-07 PROCEDURE — 64494 INJ PARAVERT F JNT L/S 2 LEV: CPT | Performed by: PHYSICAL MEDICINE & REHABILITATION

## 2022-12-07 PROCEDURE — 64493 INJ PARAVERT F JNT L/S 1 LEV: CPT | Performed by: PHYSICAL MEDICINE & REHABILITATION

## 2022-12-07 RX ORDER — TRIAMCINOLONE ACETONIDE 40 MG/ML
200 INJECTION, SUSPENSION INTRA-ARTICULAR; INTRAMUSCULAR ONCE
Status: COMPLETED | OUTPATIENT
Start: 2022-12-07 | End: 2022-12-07

## 2022-12-07 RX ADMIN — TRIAMCINOLONE ACETONIDE 200 MG: 40 INJECTION, SUSPENSION INTRA-ARTICULAR; INTRAMUSCULAR at 11:49

## 2022-12-07 NOTE — PROGRESS NOTES
Date: 12/07/22   Name: Charlotte Guillaume    Pre-Procedural Diagnosis:    Diagnosis Orders   1. Spondylosis of lumbar region without myelopathy or radiculopathy  FL INJ LUMB/SAC FACET SINGLE LEVEL    XR INJ FACET LUMB SACRAL 2ND LVL    triamcinolone acetonide (KENALOG-40) injection 200 mg          Procedure: Bilateral Facet Joint Injections (2 levels)    Precautions:  LBH Precautions spine injections: None. Patient denies any prior sensitivity to steroid, local anesthetic, contrast dye, iodine or shellfish. The procedure was discussed at length with the patient and informed consent was signed. The patient was placed in a prone position on the fluoroscopy table and the skin was prepped and draped in a routine sterile fashion. The areas to be injected were each anesthetized with approximately 2-3 cc of 1% Lidocaine. Initially a 22-gauge 3.5 inch inch spinal needle was carefully advanced under fluoroscopic guidance to the bilateral L4-L5 and L5-S1 facet joint spaces. 1 cc of 0.25% Marcaine and 50 mg of Kenalog were injected through the spinal needle at each site. Fluoroscopic guidance was used intermittently over a 10-minute period to insure proper needle placement and patient safety. A hard copy of the fluoroscopic  images has been placed in the patient's chart. The patient was monitored after the procedure and discharged home without complication.      Resume Meds:     N/A    Carolyn Padilla MD  12/07/22

## 2023-01-20 ENCOUNTER — OFFICE VISIT (OUTPATIENT)
Dept: ORTHOPEDIC SURGERY | Age: 78
End: 2023-01-20

## 2023-01-20 DIAGNOSIS — M12.812 ROTATOR CUFF ARTHROPATHY OF BOTH SHOULDERS: ICD-10-CM

## 2023-01-20 DIAGNOSIS — S49.92XA INJURY OF LEFT SHOULDER, INITIAL ENCOUNTER: ICD-10-CM

## 2023-01-20 DIAGNOSIS — M25.512 LEFT SHOULDER PAIN, UNSPECIFIED CHRONICITY: Primary | ICD-10-CM

## 2023-01-20 DIAGNOSIS — M12.811 ROTATOR CUFF ARTHROPATHY OF BOTH SHOULDERS: ICD-10-CM

## 2023-01-20 RX ORDER — AMOXICILLIN AND CLAVULANATE POTASSIUM 875; 125 MG/1; MG/1
1 TABLET, FILM COATED ORAL 2 TIMES DAILY
Qty: 28 TABLET | Refills: 2 | Status: SHIPPED | OUTPATIENT
Start: 2023-01-20 | End: 2023-02-03

## 2023-01-20 NOTE — PROGRESS NOTES
Name: Naga Ferguson  YOB: 1945  Gender: female  MRN: [id number]     CC: Left shoulder injury    HPI:   11/21/2022: Visit with me regarding bilateral shoulder pain: Each shoulder was injected with diagnosis of rotator cuff arthropathy, glenohumeral arthritis  ~01/10/2023: She reports falling on her left shoulder with increased shoulder pain. 01/20/2023: She presents to assess both shoulders. She thinks she may have chad the right when she injured the left     ROS/Meds/PSH/PMH/FH/SH: reviewed today    Tobacco:  reports that she has quit smoking. She has never used smokeless tobacco.     Physical Examination:  Patient appears to be alert and oriented with acceptable appearance. No obvious distress or SOB  CV: appears to have acceptable vascular color and capillary refill  Neuro: appears to have mostly intact light touch sensation   Skin: No soft tissue swelling; left elbow abrasion with slight redness; dry eschar  MS:   Neck = no pain with neck motion and with neck stress testing  Right: Left = pain only with reaching to her thoracolumbar spine  Right: Left = otherwise, good shoulder motion; 5/5 strength; no instability; no crepitance  Right: Left = except the left elbow abrasion no humerus elbow or arm pain    XR: Left shoulder: AP plus AP spot film with Grashey and axillary views taken today with no acute pathology or fracture appreciated  XR Impression:  As above      Reviewed Test/Records/Documents:   12/07/2022: Bilateral two-level facet injections with Dr. Anabell Kelly    Injection: Not applicable today    Assessment:    Left shoulder injury with chronic rotator cuff arthropathy  Right shoulder chronic rotator cuff arthropathy    Plan:   The patient and I discussed the above assessment. We explored treatment options. She fell in her house jarring her left shoulder but feels she may have also jerked her right shoulder  She has almost full active motion with no crepitance.   No sign of a fracture  She scraped her left elbow and has concerns that her wounds are infected easily   We discussed sling protection and PT, but she does not feel she needs either one    She will do a home exercise program   She will return as needed/as discussed   Advanced medical imaging: Left shoulder MRI scan: Right shoulder MRI scan: Potential future  She understands discussed shoulder care and protection  PT: Offered today       Medication - OTC meds prn: Prescribed: Augmentin 875 mg 1 p.o. BID: 2 weeks: 2 refills   Surgical discussion: Discussed future TSA  Follow up: As discussed/as needed  Work status: Not applicable     This note was created using Dragon voice recognition software which may result in errors of speech and spelling recognition and word/phrase syntax errors.

## 2023-01-20 NOTE — LETTER
[facility]  Arthritis oral choices: Individual Turmeric-Curcumin; Lenoria Massa; Boswellia                           -or-   Combination [employer] Turmeric strength for joints that includes all 3 listed above     Magne topical Sports:   Balm or liquid Spray with frankincense/myrrh      Nerve medication options:   CBD, Alpha Lipoic acid, Lion's maddie and any other recommended neuropathic medication            Immune/healing possibilities:  Echinacea, Elderberry    As Needed: Dead sea bath salts, Essential Oils, scar cream, Gout and cramping medications    The above list of recommended medications is only a starting point. Please allow the experts at [facility] to make the final recommendations. Before using any of these medications, please make sure that you have no concerns, senstivities or allergies to the listed ingredients in each bottle/container. Also, please check with your pharmacist or your primary care physician regarding any and all possible interactions with your other daily medications. [facility] Locations:   [facility]  [address] [place] [address], [address] [place] [address]            Sincerely,      Danielle Mayer MD

## 2023-02-24 ENCOUNTER — OFFICE VISIT (OUTPATIENT)
Dept: ORTHOPEDIC SURGERY | Age: 78
End: 2023-02-24

## 2023-02-24 DIAGNOSIS — M19.012 ARTHRITIS OF BOTH SHOULDER REGIONS: Primary | ICD-10-CM

## 2023-02-24 DIAGNOSIS — G89.29 CHRONIC RIGHT SHOULDER PAIN: ICD-10-CM

## 2023-02-24 DIAGNOSIS — M19.011 ARTHRITIS OF BOTH SHOULDER REGIONS: Primary | ICD-10-CM

## 2023-02-24 DIAGNOSIS — M25.512 CHRONIC LEFT SHOULDER PAIN: ICD-10-CM

## 2023-02-24 DIAGNOSIS — M25.511 CHRONIC RIGHT SHOULDER PAIN: ICD-10-CM

## 2023-02-24 DIAGNOSIS — G89.29 CHRONIC LEFT SHOULDER PAIN: ICD-10-CM

## 2023-02-24 RX ORDER — METHYLPREDNISOLONE ACETATE 40 MG/ML
40 INJECTION, SUSPENSION INTRA-ARTICULAR; INTRALESIONAL; INTRAMUSCULAR; SOFT TISSUE ONCE
Status: COMPLETED | OUTPATIENT
Start: 2023-02-24 | End: 2023-02-24

## 2023-02-24 RX ADMIN — METHYLPREDNISOLONE ACETATE 80 MG: 40 INJECTION, SUSPENSION INTRA-ARTICULAR; INTRALESIONAL; INTRAMUSCULAR; SOFT TISSUE at 11:10

## 2023-02-24 NOTE — PROGRESS NOTES
Name: Charlotte Guillaume  YOB: 1945  Gender: female  MRN: [id number]     02/24/2023: She reports mainly Left shoulder/arm pain. HPI:   11/21/2022: Visit with me regarding bilateral shoulder pain: Each shoulder was injected with diagnosis of rotator cuff arthropathy, glenohumeral arthritis  ~01/10/2023: She reports falling on her left shoulder with increased shoulder pain. 01/20/2023: Presented for: Both shoulders. She chad the right when she injured the left     ROS/Meds/PSH/PMH/FH/SH: reviewed today    Tobacco:  reports that she has quit smoking. She has never used smokeless tobacco.     Physical Examination:  Patient appears to be alert and oriented with acceptable appearance. No obvious distress or SOB  CV: appears to have acceptable vascular color and capillary refill  Neuro: appears to have mostly intact light touch sensation   Skin: No soft tissue swelling   MS:   Neck = no pain with neck motion and neck stress testing  Left = shoulder impingement pain; no Deltoid, biceps or triceps deficiency  Right: Left = 5/5 strength; no instability; no crepitance     XR: Left shoulder: AP plus AP spot film with Grashey and axillary views taken 01/20/2023 with no acute pathology or fracture appreciated  XR Impression:  As above      Reviewed Test/Records/Documents:   12/07/2022: Bilateral two-level facet injections with Dr. Mendez Joyce    Injection: We discussed risk complications to proceed. After sterile prep, Left shoulder injected with 2 cc Xylocaine and 80 mg Depo-Medrol; she appeared to do well    Assessment:    Left shoulder injury; chronic rotator cuff arthropathy shoulder impingement  Right shoulder chronic rotator cuff arthropathy shoulder impingement    Plan:   The patient and I discussed the above assessment. We explored treatment options.      Radiographs January 20, 2023 with no definite fracture  Her exam today is more consistent with rotator cuff arthropathy, shoulder impingement  She hurts on the left > right so the plan is to do an injection of the left and re-evaluate      If the injection does not help: Consider Advanced medical imaging: Left shoulder MRI scan:     She understands discussed shoulder care and protection  PT: Offered today       Medication - OTC meds prn:    Surgical discussion: Discussed future TSA  Follow up: 2 weeks  Work status: Not applicable     This note was created using Dragon voice recognition software which may result in errors of speech and spelling recognition and word/phrase syntax errors.

## 2023-02-28 ENCOUNTER — TELEPHONE (OUTPATIENT)
Dept: ORTHOPEDIC SURGERY | Age: 78
End: 2023-02-28

## 2023-02-28 NOTE — TELEPHONE ENCOUNTER
Pt called was told by [facility] to comeback for 1 week f/u pt got jasmine on the wrong date 3/10 she wants to know if she can come in tomorrow 3/1 or 3/3 morning appt   please advise

## 2023-02-28 NOTE — TELEPHONE ENCOUNTER
Called and spoke to pt .  note said to follow up in 2 weeks but pt ask to be seen sooner so apt was reschedule from 03/10 to 3/3 at 10:00 am at [address].

## 2023-03-03 ENCOUNTER — OFFICE VISIT (OUTPATIENT)
Dept: ORTHOPEDIC SURGERY | Age: 78
End: 2023-03-03

## 2023-03-03 DIAGNOSIS — M20.5X1 ACQUIRED CLAW TOE OF RIGHT FOOT: ICD-10-CM

## 2023-03-03 DIAGNOSIS — M79.671 RIGHT FOOT PAIN: Primary | ICD-10-CM

## 2023-03-03 NOTE — PROGRESS NOTES
The patient was prescribed and fitted with a DualAction toe spacer/bunion guard for the right foot. Patient read and signed documenting they understand and agree to [facility]'s current DME return policy.

## 2023-03-03 NOTE — PROGRESS NOTES
Name: Marcello Carreon  YOB: 1945  Gender: female  MRN: [id number]    02/24/2023: Diagnoses:   Left shoulder injury; chronic rotator cuff arthropathy shoulder impingement  Right shoulder chronic rotator cuff arthropathy shoulder impingement  02/24/2023: Left shoulder injection: She did really well.    03/03/2023: Presents to assess 3 conditions. 1.  Right shoulder. Minimal if any symptoms  2. Left shoulder. Much better with only mild residual outer arm pain but not limiting   3. Right second toe. She has noticed more pain and deformity in the toe    Exam:  No pain with right or left shoulder motion. Near full active shoulder motion; no motor loss  Standing: Bilateral tibial 2-3 claw toe; right mild bunion  Right = painful second claw toe    Diagnoses:  Chronic bilateral shoulder rotator cuff arthropathy stable at this time  Left tibial 2-3 claw toes  Right bunion; tibial 2-3 claw toes    Plan:  Regarding her shoulders, she is content watching at this time. I discussed shoulder MRI scan but she is not interested in pursuing surgery and feels better  Offered physical therapy but she feels comfortable with her home treatment    Regarding her forefoot deformities, her right side is the symptomatic one  I discussed: Right bunionectomy; 2-3 claw toe resections with risks and complications   She would like to hold surgery at this time  To try to help with her 1-2 toe pressure, applied dual action toe spacers    She will return as needed  Retired    This note was created using Dragon voice recognition software which may result in errors of speech and spelling recognition and word/phrase syntax errors.

## 2023-05-16 ENCOUNTER — TELEPHONE (OUTPATIENT)
Dept: ORTHOPEDIC SURGERY | Age: 78
End: 2023-05-16

## 2023-05-19 ENCOUNTER — OFFICE VISIT (OUTPATIENT)
Dept: ORTHOPEDIC SURGERY | Age: 78
End: 2023-05-19

## 2023-05-19 DIAGNOSIS — M47.816 SPONDYLOSIS OF LUMBAR REGION WITHOUT MYELOPATHY OR RADICULOPATHY: Primary | ICD-10-CM

## 2023-05-19 RX ORDER — TRIAMCINOLONE ACETONIDE 40 MG/ML
200 INJECTION, SUSPENSION INTRA-ARTICULAR; INTRAMUSCULAR ONCE
Status: COMPLETED | OUTPATIENT
Start: 2023-05-19 | End: 2023-05-19

## 2023-05-19 RX ADMIN — TRIAMCINOLONE ACETONIDE 200 MG: 40 INJECTION, SUSPENSION INTRA-ARTICULAR; INTRAMUSCULAR at 10:30

## 2023-05-19 NOTE — PROGRESS NOTES
Date: 05/19/23   Name: Kimberly Murray    Pre-Procedural Diagnosis:    Diagnosis Orders   1. Spondylosis of lumbar region without myelopathy or radiculopathy  FL INJ LUMB/SAC FACET SINGLE LEVEL    XR INJ FACET LUMB SACRAL 2ND LVL          Procedure: Bilateral Facet Joint Injections (2 levels)    Precautions:  LBH Precautions spine injections: None. Patient denies any prior sensitivity to steroid, local anesthetic, contrast dye, iodine or shellfish. The procedure was discussed at length with the patient and informed consent was signed. The patient was placed in a prone position on the fluoroscopy table and the skin was prepped and draped in a routine sterile fashion. The areas to be injected were each anesthetized with approximately 2-3 cc of 1% Lidocaine. Initially a 22-gauge 3.5 inch spinal needle was carefully advanced under fluoroscopic guidance to the bilateral L4-L5 and L5-S1 facet joint spaces. 1 cc of 0.25% Marcaine and 50 mg of Kenalog were injected through the spinal needle at each site. Fluoroscopic guidance was used intermittently over a 10-minute period to insure proper needle placement and patient safety. A hard copy of the fluoroscopic  images has been placed in the patient's chart. The patient was monitored after the procedure and discharged home without complication. A total of 5 cc of Kenalog were administered during this procedure.     Resume Meds:     N/A    Alana Quiñonez MD  05/19/23

## 2023-06-19 ENCOUNTER — TELEPHONE (OUTPATIENT)
Age: 78
End: 2023-06-19

## 2023-06-19 RX ORDER — METOPROLOL SUCCINATE 25 MG/1
25 TABLET, EXTENDED RELEASE ORAL 2 TIMES DAILY
Qty: 60 TABLET | Refills: 11 | Status: SHIPPED | OUTPATIENT
Start: 2023-06-19

## 2023-06-19 NOTE — TELEPHONE ENCOUNTER
Saw  last week post CVERSION and was on Metoprolol and told to call if symptoms did not improve. She started doubling her Metoprolol and taking 25mg bid since Saturday and it has not helped. She is still having sensation like her heart is pounding off/on and skipping beats. Has no way to check her BP or HR at home. She just does not feel good. Should she stay on twice daily dosing and give it more time? Should she come back in? Please advise. Peter Peraza

## 2023-06-19 NOTE — TELEPHONE ENCOUNTER
Claire Self MD  You Just now (4:43 PM)     GS  I do not see a cardioversion. Increase metoprolol to twice a day    I clarified w/pt. what she had  DONE and after clarification she had an EKG not a CVERSION. I told her MD response and she v/u. Med escribed as below  Requested Prescriptions     Signed Prescriptions Disp Refills    metoprolol succinate (TOPROL XL) 25 MG extended release tablet 60 tablet 11     Sig: Take 1 tablet by mouth in the morning and at bedtime     Authorizing Provider: Mario Alberto Jasso     Ordering User: YUNIER CARDONA

## 2023-06-19 NOTE — TELEPHONE ENCOUNTER
Pt states she was advised to call back if her pounding heart beat did not get better. Pt states she is still experiencing her heart skipping and pounding. States she doubled up on her metoprolol, as advised by Dr. Serge Mcguire, but it doesn't help. Denies CP and SOB.

## 2023-06-20 ENCOUNTER — TELEPHONE (OUTPATIENT)
Dept: ORTHOPEDIC SURGERY | Age: 78
End: 2023-06-20

## 2023-06-20 NOTE — TELEPHONE ENCOUNTER
Called pt to see how she was feeling and ask if she'd like to reschedule missed apt. Pt stated her shoulders were feeling good. She will call back if ever needed.

## 2023-09-15 ENCOUNTER — OFFICE VISIT (OUTPATIENT)
Dept: ORTHOPEDIC SURGERY | Age: 78
End: 2023-09-15

## 2023-09-15 DIAGNOSIS — M25.511 CHRONIC RIGHT SHOULDER PAIN: Primary | ICD-10-CM

## 2023-09-15 DIAGNOSIS — M19.012 ARTHRITIS OF BOTH SHOULDER REGIONS: ICD-10-CM

## 2023-09-15 DIAGNOSIS — M25.512 CHRONIC LEFT SHOULDER PAIN: ICD-10-CM

## 2023-09-15 DIAGNOSIS — M19.011 ARTHRITIS OF BOTH SHOULDER REGIONS: ICD-10-CM

## 2023-09-15 DIAGNOSIS — G89.29 CHRONIC LEFT SHOULDER PAIN: ICD-10-CM

## 2023-09-15 DIAGNOSIS — G89.29 CHRONIC RIGHT SHOULDER PAIN: Primary | ICD-10-CM

## 2023-09-15 RX ORDER — DULOXETIN HYDROCHLORIDE 20 MG/1
20 CAPSULE, DELAYED RELEASE ORAL DAILY
COMMUNITY
Start: 2023-08-11

## 2023-09-15 RX ORDER — LANOLIN ALCOHOL/MO/W.PET/CERES
1000 CREAM (GRAM) TOPICAL DAILY
COMMUNITY
Start: 2023-06-17

## 2023-09-15 RX ORDER — METHYLPREDNISOLONE ACETATE 40 MG/ML
40 INJECTION, SUSPENSION INTRA-ARTICULAR; INTRALESIONAL; INTRAMUSCULAR; SOFT TISSUE ONCE
Status: COMPLETED | OUTPATIENT
Start: 2023-09-15 | End: 2023-09-15

## 2023-09-15 RX ORDER — BLOOD SUGAR DIAGNOSTIC
STRIP MISCELLANEOUS
COMMUNITY
Start: 2023-07-18

## 2023-09-15 RX ORDER — SEMAGLUTIDE 0.68 MG/ML
INJECTION, SOLUTION SUBCUTANEOUS
COMMUNITY
Start: 2023-08-29

## 2023-09-15 RX ADMIN — METHYLPREDNISOLONE ACETATE 80 MG: 40 INJECTION, SUSPENSION INTRA-ARTICULAR; INTRALESIONAL; INTRAMUSCULAR; SOFT TISSUE at 13:14

## 2023-09-15 RX ADMIN — METHYLPREDNISOLONE ACETATE 40 MG: 40 INJECTION, SUSPENSION INTRA-ARTICULAR; INTRALESIONAL; INTRAMUSCULAR; SOFT TISSUE at 13:15

## 2023-09-15 NOTE — PROGRESS NOTES
Name: Elisha Mcneill  YOB: 1945  Gender: female  MRN: [id number]    Chief complaint: Shoulder pain    HPI:  Chronic bilateral shoulder pain. Recent flare of pain with no trauma. Physical exam:  She is alert responsive and good appearance  Bilateral = appears neurovascular intact  Bilateral = no soft tissue swelling  Bilateral = shoulder impingement limitations but no deltoid, biceps or triceps deficiency; no crepitance    XR: No indication today for radiographs    Diagnoses:   Left shoulder injury; chronic rotator cuff arthropathy shoulder impingement  Right shoulder chronic rotator cuff arthropathy shoulder impingement    Injections: We discussed risks/complications of injection and decided proceed: After sterile prep: EACH RIGHT LEFT shoulder joint injected 2 cc Xylocaine, 80 mg Depo-Medrol; she seemed to do well; she denies any injection glucose issues in the past     Plan:  She understands issues and concerns related to repeat injections  She feels good relief with intermittent injections and desires no surgical intervention  Upcoming appointment with Dr. Aminta Pierson regarding her back and if she has any neck concerns at that time she will discuss with Dr. Aminta Pierson    This note was created using Dragon voice recognition software which may result in errors of speech and spelling recognition and word/phrase syntax errors.

## 2023-11-07 ENCOUNTER — OFFICE VISIT (OUTPATIENT)
Dept: ORTHOPEDIC SURGERY | Age: 78
End: 2023-11-07
Payer: MEDICARE

## 2023-11-07 ENCOUNTER — OFFICE VISIT (OUTPATIENT)
Dept: ORTHOPEDIC SURGERY | Age: 78
End: 2023-11-07

## 2023-11-07 DIAGNOSIS — M47.816 SPONDYLOSIS OF LUMBAR REGION WITHOUT MYELOPATHY OR RADICULOPATHY: Primary | ICD-10-CM

## 2023-11-07 PROCEDURE — 64493 INJ PARAVERT F JNT L/S 1 LEV: CPT | Performed by: PHYSICAL MEDICINE & REHABILITATION

## 2023-11-07 PROCEDURE — 64494 INJ PARAVERT F JNT L/S 2 LEV: CPT | Performed by: PHYSICAL MEDICINE & REHABILITATION

## 2023-11-07 RX ORDER — TRIAMCINOLONE ACETONIDE 40 MG/ML
200 INJECTION, SUSPENSION INTRA-ARTICULAR; INTRAMUSCULAR ONCE
Status: COMPLETED | OUTPATIENT
Start: 2023-11-07 | End: 2023-11-07

## 2023-11-07 RX ADMIN — TRIAMCINOLONE ACETONIDE 200 MG: 40 INJECTION, SUSPENSION INTRA-ARTICULAR; INTRAMUSCULAR at 16:00

## 2023-11-07 NOTE — PROGRESS NOTES
comfortable where she is and I would tend to agree. Certainly if injections lose duration of benefit we can have a discussion on that.         Arvin Leigh MD

## 2023-11-07 NOTE — PROGRESS NOTES
Date: 11/07/23   Name: Neli Denise    Pre-Procedural Diagnosis:    Diagnosis Orders   1. Spondylosis of lumbar region without myelopathy or radiculopathy  FL INJ LUMB/SAC FACET SINGLE LEVEL    XR INJ FACET LUMB SACRAL 2ND LVL    triamcinolone acetonide (KENALOG-40) injection 200 mg          Procedure: Bilateral Facet Joint Injections (2 levels)    Precautions:  LBH Precautions spine injections: None. Patient denies any prior sensitivity to steroid, local anesthetic, contrast dye, iodine or shellfish. The procedure was discussed at length with the patient and informed consent was signed. The patient was placed in a prone position on the fluoroscopy table and the skin was prepped and draped in a routine sterile fashion. The areas to be injected were each anesthetized with approximately 2-3 cc of 1% Lidocaine. Initially a 22-gauge 3.5 inch spinal needle was carefully advanced under fluoroscopic guidance to the bilateral L4-L5 and L5-S1 facet joint spaces. 1 cc of 0.25% Marcaine and 50 mg of Kenalog were injected through the spinal needle at each site. Fluoroscopic guidance was used intermittently over a 10-minute period to insure proper needle placement and patient safety. A hard copy of the fluoroscopic  images has been placed in the patient's chart. The patient was monitored after the procedure and discharged home without complication. A total of 5 cc of Kenalog were administered during this procedure.     Resume Meds:     N/A    Mitzy Jean MD  11/07/23

## 2023-12-14 ENCOUNTER — OFFICE VISIT (OUTPATIENT)
Age: 78
End: 2023-12-14
Payer: MEDICARE

## 2023-12-14 VITALS
SYSTOLIC BLOOD PRESSURE: 144 MMHG | HEART RATE: 92 BPM | BODY MASS INDEX: 25.39 KG/M2 | DIASTOLIC BLOOD PRESSURE: 88 MMHG | WEIGHT: 158 LBS | HEIGHT: 66 IN

## 2023-12-14 DIAGNOSIS — R53.83 MALAISE AND FATIGUE: ICD-10-CM

## 2023-12-14 DIAGNOSIS — R53.81 MALAISE AND FATIGUE: ICD-10-CM

## 2023-12-14 DIAGNOSIS — R00.2 PALPITATION: Primary | ICD-10-CM

## 2023-12-14 PROCEDURE — G8400 PT W/DXA NO RESULTS DOC: HCPCS | Performed by: INTERNAL MEDICINE

## 2023-12-14 PROCEDURE — 1090F PRES/ABSN URINE INCON ASSESS: CPT | Performed by: INTERNAL MEDICINE

## 2023-12-14 PROCEDURE — 1123F ACP DISCUSS/DSCN MKR DOCD: CPT | Performed by: INTERNAL MEDICINE

## 2023-12-14 PROCEDURE — G8484 FLU IMMUNIZE NO ADMIN: HCPCS | Performed by: INTERNAL MEDICINE

## 2023-12-14 PROCEDURE — 1036F TOBACCO NON-USER: CPT | Performed by: INTERNAL MEDICINE

## 2023-12-14 PROCEDURE — G8417 CALC BMI ABV UP PARAM F/U: HCPCS | Performed by: INTERNAL MEDICINE

## 2023-12-14 PROCEDURE — G8428 CUR MEDS NOT DOCUMENT: HCPCS | Performed by: INTERNAL MEDICINE

## 2023-12-14 PROCEDURE — 99214 OFFICE O/P EST MOD 30 MIN: CPT | Performed by: INTERNAL MEDICINE

## 2023-12-14 RX ORDER — TIRZEPATIDE 5 MG/.5ML
INJECTION, SOLUTION SUBCUTANEOUS
COMMUNITY
Start: 2023-12-08

## 2023-12-14 NOTE — PROGRESS NOTES
[facility] CARDIOLOGY  [id number] [place] [address]  PHONE: [phone]        23        NAME:  Josue Pacheco  : 1945  MRN: [id number]     Palpitation  Malaise and fatigue  Type 2 diabetic    CHIEF COMPLAINT:    No chief complaint on file. SUBJECTIVE:       Not tolerating Mounjaro due to nausea, fatigue and no appetite. No inc palpitation. She does not check BP at home. Fatigue persists. Medications were all reviewed with the patient today and updated as necessary. Current Outpatient Medications   Medication Sig    MOUNJARO 5 MG/0.5ML SOPN SC injection     DULoxetine (CYMBALTA) 20 MG extended release capsule Take 1 capsule by mouth daily    metFORMIN (GLUCOPHAGE) 500 MG tablet     metoprolol succinate (TOPROL XL) 25 MG extended release tablet Take 1 tablet by mouth in the morning and at bedtime    zolpidem (AMBIEN) 10 MG tablet 0.5 tablets nightly as needed. omeprazole (PRILOSEC) 20 MG delayed release capsule     glipiZIDE (GLUCOTROL) 5 MG tablet Take 1 tablet by mouth daily    metFORMIN (GLUCOPHAGE-XR) 500 MG extended release tablet Take 1 tablet by mouth daily    rosuvastatin (CRESTOR) 10 MG tablet Take 1 tablet by mouth every other day    [facility]  GLUCOSE TEST strip USE AS DIRECTED ONE TIME DAILY    PX Lancets Ultra Thin 28G MISC USE TO CHECK BLOOD SUGAR ONE TIME DAILY     No current facility-administered medications for this visit. Allergies   Allergen Reactions    Gabapentin Other (See Comments)     Jerking  Other reaction(s): Other (see comments)  Jerking    Tizanidine Other (See Comments)     Neck jerks  Other reaction(s): Other- (not listed) - Allergy  Neck jerks  Other reaction(s): Other (see comments)  Neck jerks    Neck jerks Other reaction(s):  Other- (not listed) - Allergy Neck jerks           PHYSICAL EXAM:     Wt Readings from Last 3 Encounters:   23 71.7 kg (158 lb)   23 73.5 kg (162 lb)   22 78.9 kg

## 2024-01-10 ENCOUNTER — OFFICE VISIT (OUTPATIENT)
Dept: ENDOCRINOLOGY | Age: 79
End: 2024-01-10
Payer: MEDICARE

## 2024-01-10 VITALS
DIASTOLIC BLOOD PRESSURE: 82 MMHG | OXYGEN SATURATION: 98 % | WEIGHT: 157.4 LBS | SYSTOLIC BLOOD PRESSURE: 118 MMHG | BODY MASS INDEX: 25.41 KG/M2 | HEART RATE: 82 BPM

## 2024-01-10 DIAGNOSIS — E04.2 MULTIPLE THYROID NODULES: Primary | ICD-10-CM

## 2024-01-10 DIAGNOSIS — E11.65 TYPE 2 DIABETES MELLITUS WITH HYPERGLYCEMIA, WITHOUT LONG-TERM CURRENT USE OF INSULIN (HCC): ICD-10-CM

## 2024-01-10 PROCEDURE — G8417 CALC BMI ABV UP PARAM F/U: HCPCS | Performed by: INTERNAL MEDICINE

## 2024-01-10 PROCEDURE — 99214 OFFICE O/P EST MOD 30 MIN: CPT | Performed by: INTERNAL MEDICINE

## 2024-01-10 PROCEDURE — 1036F TOBACCO NON-USER: CPT | Performed by: INTERNAL MEDICINE

## 2024-01-10 PROCEDURE — 76536 US EXAM OF HEAD AND NECK: CPT | Performed by: INTERNAL MEDICINE

## 2024-01-10 PROCEDURE — 1090F PRES/ABSN URINE INCON ASSESS: CPT | Performed by: INTERNAL MEDICINE

## 2024-01-10 PROCEDURE — G8400 PT W/DXA NO RESULTS DOC: HCPCS | Performed by: INTERNAL MEDICINE

## 2024-01-10 PROCEDURE — 1123F ACP DISCUSS/DSCN MKR DOCD: CPT | Performed by: INTERNAL MEDICINE

## 2024-01-10 PROCEDURE — G8484 FLU IMMUNIZE NO ADMIN: HCPCS | Performed by: INTERNAL MEDICINE

## 2024-01-10 PROCEDURE — G8427 DOCREV CUR MEDS BY ELIG CLIN: HCPCS | Performed by: INTERNAL MEDICINE

## 2024-01-10 ASSESSMENT — ENCOUNTER SYMPTOMS
VOICE CHANGE: 0
TROUBLE SWALLOWING: 0

## 2024-01-10 NOTE — PROGRESS NOTES
LIAM Cortés MD, FACE    [facility] ENDOCRINOLOGY   AND   THYROID NODULE CLINIC            Reason for visit: Follow-up of thyroid nodules      ASSESSMENT AND PLAN:    1. Multiple thyroid nodules  Prior biopsies of the dominant nodules in December 2018 and January 2019 were fortunately benign.  Ultrasound was repeated today and is not significantly changed.  Repeat in 1 year.  If stable at that time, ongoing ultrasound follow-up would not be necessary.   - US,HEAD/NECK TISSUES,REAL TIME    2. Type 2 diabetes mellitus with hyperglycemia, without long-term current use of insulin (HCC)  Per her report, diabetes is poorly controlled.  She thinks that her most recent hemoglobin A1c was between 10 and 12%.  She was appropriately prescribed metformin and Ozempic.  Due to apparent lack of efficacy, Ozempic was appropriately replaced with Mounjaro.  She is intolerant of Mounjaro and has stopped it.  She currently takes only metformin 750 mg at bedtime (which is not how it is prescribed by her primary care provider).  I would recommend that Ozempic be resumed (since she did tolerate it).  She will likely require additional therapy.  Basal insulin would be reasonable third line therapy for her.  She does not require specialty care for this problem.        PROCEDURES:    HEAD AND NECK ULTRASOUND    Date of study:  1/10/2024    Performing/interpreting physician:  LIAM Cortés MD, FACE    Indication:  thyroid nodule    Technique:  Using a 12 MHz linear transducer, multiple real-time planar images were obtained of the thyroid and surrounding tissues.      Findings:  Right lobe 2.10 x 1.97 x 3.27 cm, left lobe 1.88 x 2.14 x 3.44 cm.  Heterogeneous echotexture.  Normal blood flow.  Multiple nodules.  Those greater then 1 cm in maximal diameter are as follows: 1.16 x 1.78 x 2.25 cm complex isoechoic nodule without calcifications or increased blood flow at the right lower pole (TR 3).  1.36 x 2.44 x 2.32 cm hypoechoic nodule

## 2024-01-17 ENCOUNTER — TELEPHONE (OUTPATIENT)
Dept: ENDOCRINOLOGY | Age: 79
End: 2024-01-17

## 2024-01-17 NOTE — TELEPHONE ENCOUNTER
Patient called angelag Carla needs a call from our office to her primary care provider Dr Sheldon about the change in her medication.

## 2024-01-17 NOTE — TELEPHONE ENCOUNTER
Called the [facility] office, they are asking what Dr Bills recommendation is on how she should be treated. The lady I spoke to was not clinical and would not transfer me to someone who was for further explanation. She said someone would call me back

## 2024-01-18 NOTE — TELEPHONE ENCOUNTER
[facility] office called stating patient said we would call them and reach out about her diabetes med. I do not see where Milana put that in his notes. They did ask if we would send chart notes over to them. Will fax them over

## 2024-02-08 ENCOUNTER — HOSPITAL ENCOUNTER (OUTPATIENT)
Dept: GENERAL RADIOLOGY | Age: 79
Discharge: HOME | End: 2024-02-08
Payer: MEDICARE

## 2024-02-08 DIAGNOSIS — M51.36 DDD (DEGENERATIVE DISC DISEASE), LUMBAR: ICD-10-CM

## 2024-02-08 PROCEDURE — 72100 X-RAY EXAM L-S SPINE 2/3 VWS: CPT

## 2024-02-08 PROCEDURE — 72170 X-RAY EXAM OF PELVIS: CPT

## 2024-03-08 ENCOUNTER — TELEPHONE (OUTPATIENT)
Dept: ORTHOPEDIC SURGERY | Age: 79
End: 2024-03-08

## 2024-03-08 DIAGNOSIS — M47.816 SPONDYLOSIS OF LUMBAR REGION WITHOUT MYELOPATHY OR RADICULOPATHY: Primary | ICD-10-CM

## 2024-03-08 DIAGNOSIS — M54.16 LUMBAR RADICULOPATHY: ICD-10-CM

## 2024-03-14 ENCOUNTER — TELEPHONE (OUTPATIENT)
Dept: ORTHOPEDIC SURGERY | Age: 79
End: 2024-03-14

## 2024-03-14 NOTE — TELEPHONE ENCOUNTER
Pt called she in lot pain with her neck,she wants to be seen today per patient.please call .she said dr moon knows her

## 2024-03-15 ENCOUNTER — TELEPHONE (OUTPATIENT)
Dept: ORTHOPEDIC SURGERY | Age: 79
End: 2024-03-15

## 2024-03-15 NOTE — TELEPHONE ENCOUNTER
87 Patient states she talked with someone yesterday and was wanting to get in sooner than 3/27 for an injection

## 2024-03-20 ENCOUNTER — OFFICE VISIT (OUTPATIENT)
Dept: ORTHOPEDIC SURGERY | Age: 79
End: 2024-03-20

## 2024-03-20 VITALS — HEIGHT: 67 IN | BODY MASS INDEX: 24.92 KG/M2 | WEIGHT: 158.8 LBS

## 2024-03-20 DIAGNOSIS — M25.561 ACUTE PAIN OF RIGHT KNEE: Primary | ICD-10-CM

## 2024-03-20 DIAGNOSIS — M17.12 PRIMARY OSTEOARTHRITIS OF LEFT KNEE: ICD-10-CM

## 2024-03-20 DIAGNOSIS — M25.562 ACUTE PAIN OF LEFT KNEE: ICD-10-CM

## 2024-03-20 DIAGNOSIS — M17.11 PRIMARY OSTEOARTHRITIS OF RIGHT KNEE: ICD-10-CM

## 2024-03-20 RX ORDER — METHYLPREDNISOLONE ACETATE 40 MG/ML
40 INJECTION, SUSPENSION INTRA-ARTICULAR; INTRALESIONAL; INTRAMUSCULAR; SOFT TISSUE ONCE
Status: COMPLETED | OUTPATIENT
Start: 2024-03-20 | End: 2024-03-20

## 2024-03-20 RX ADMIN — METHYLPREDNISOLONE ACETATE 40 MG: 40 INJECTION, SUSPENSION INTRA-ARTICULAR; INTRALESIONAL; INTRAMUSCULAR; SOFT TISSUE at 10:15

## 2024-03-20 RX ADMIN — METHYLPREDNISOLONE ACETATE 40 MG: 40 INJECTION, SUSPENSION INTRA-ARTICULAR; INTRALESIONAL; INTRAMUSCULAR; SOFT TISSUE at 10:14

## 2024-03-20 NOTE — PROGRESS NOTES
Name: Dee Kaur  YOB: 1945  Gender: female  MRN: [id number]    CC: Bilateral knee pain    HPI: Dee Kaur is a 78 y.o. female who presents with bilateral knee pain.  She has been followed in the past by Dr. Mederos for her back and does see him on a regular basis.  She has an appointment to see him for back in the coming weeks.  She has also been seen by Dr. Toni Ceballos for her knees.  She most recently had a series of viscosupplementation under his care in 2021.  She does feel that it is beneficial.    She states she has pain with activity along the medial joint line and some difficulty getting up and down at times.  She states it is intermittent.    She does have a history of fibromyalgia.  She is also listed to have a history of hepatitis B per her past medical history in epic.    History was obtained from patient    ROS/Meds/PSH/PMH/FH/SH: I personally reviewed the patients standard intake form.  Below are the pertinents    Tobacco:  reports that she has quit smoking. She has been exposed to tobacco smoke. She has never used smokeless tobacco.  Past Medical History:   Diagnosis Date    Anxiety disorder     Basal cell carcinoma (BCC) of forehead     Colon polyps     COVID-19 12/2021    Fibromyalgia     Gastric ulcer     Hepatitis B     Hypercholesteremia     Multiple thyroid nodules     Type 2 diabetes mellitus (HCC)     Vitamin D deficiency       Past Surgical History:   Procedure Laterality Date    COLONOSCOPY      MOHS SURGERY  11/2019    basal cell carcinoma forehead    UPPER GASTROINTESTINAL ENDOSCOPY          Physical Examination:  Physical exam: On examination of the patient's gait there is no obvious limp    On examination while standing there is decreased flexion in the lumbosacral spine without acute list or spasm.    While seated ,  the Bilateral hip is examined there is full range of motion without obvious issue . .     On examination of the  Right knee :ROM is 0

## 2024-03-22 ENCOUNTER — OFFICE VISIT (OUTPATIENT)
Dept: ORTHOPEDIC SURGERY | Age: 79
End: 2024-03-22

## 2024-03-22 DIAGNOSIS — M19.012 ARTHRITIS OF BOTH SHOULDER REGIONS: ICD-10-CM

## 2024-03-22 DIAGNOSIS — M19.011 ARTHRITIS OF BOTH SHOULDER REGIONS: ICD-10-CM

## 2024-03-22 DIAGNOSIS — G89.29 CHRONIC LEFT SHOULDER PAIN: ICD-10-CM

## 2024-03-22 DIAGNOSIS — G89.29 CHRONIC RIGHT SHOULDER PAIN: Primary | ICD-10-CM

## 2024-03-22 DIAGNOSIS — M25.512 CHRONIC LEFT SHOULDER PAIN: ICD-10-CM

## 2024-03-22 DIAGNOSIS — M25.511 CHRONIC RIGHT SHOULDER PAIN: Primary | ICD-10-CM

## 2024-03-22 RX ORDER — FLUCONAZOLE 150 MG/1
TABLET ORAL
COMMUNITY
Start: 2024-02-06

## 2024-03-22 RX ORDER — SEMAGLUTIDE 0.68 MG/ML
INJECTION, SOLUTION SUBCUTANEOUS
COMMUNITY
Start: 2024-01-23

## 2024-03-22 RX ORDER — GLIPIZIDE 10 MG/1
TABLET ORAL
COMMUNITY
Start: 2024-02-07

## 2024-03-22 RX ORDER — KETOROLAC TROMETHAMINE 5 MG/ML
SOLUTION OPHTHALMIC
COMMUNITY
Start: 2024-03-07

## 2024-03-22 NOTE — PROGRESS NOTES
Name: Dee Kaur  YOB: 1945  Gender: female  MRN: [id number]    CC: Shoulder pain    HPI:   03/22/2024: Initial visit: Bilateral chronic shoulder pain    ROS/Meds/PSH/PMH/FH/SH: reviewed today    Tobacco:  reports that she has quit smoking. She has been exposed to tobacco smoke. She has never used smokeless tobacco.     Physical Examination:  Patient appears to be alert and oriented with acceptable appearance.  No obvious distress or SOB  CV: appears to have acceptable vascular color and capillary refill  Neuro: appears to have mostly intact light touch sensation   Skin: Bilateral = no shoulder swelling; no upper extremity swelling  MS:   Bilateral = Limited shoulder motion but no motor deficiency    XR: Bilateral shoulders: AP lateral Grashey axillary views taken today with glenohumeral arthritis; AC joint arthritis  XR Impression:  As above      Reviewed Test/Records/Documents:   03/20/2024: Dr. 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 We explored treatment options.     We discussed her chronic bilateral recurrent shoulder glenohumeral/acromioclavicular arthritis, rotator cuff deficiency  She is not interested in shoulder surgery  New x-rays today without significant progressive changes  Plan is to inject but protect     Advanced medical imaging: No indication for CT scan or MRI scan  We discussed shoulder care and I went over details of shoulder protection  PT: No indication     Medication - OTC meds prn:   Surgical discussion:

## 2024-03-29 ENCOUNTER — OFFICE VISIT (OUTPATIENT)
Dept: ORTHOPEDIC SURGERY | Age: 79
End: 2024-03-29
Payer: MEDICARE

## 2024-03-29 DIAGNOSIS — M47.816 LUMBAR SPONDYLOSIS: Primary | ICD-10-CM

## 2024-03-29 PROCEDURE — 64494 INJ PARAVERT F JNT L/S 2 LEV: CPT | Performed by: PHYSICAL MEDICINE & REHABILITATION

## 2024-03-29 PROCEDURE — 64493 INJ PARAVERT F JNT L/S 1 LEV: CPT | Performed by: PHYSICAL MEDICINE & REHABILITATION

## 2024-03-29 RX ORDER — TRIAMCINOLONE ACETONIDE 40 MG/ML
200 INJECTION, SUSPENSION INTRA-ARTICULAR; INTRAMUSCULAR ONCE
Status: COMPLETED | OUTPATIENT
Start: 2024-03-29 | End: 2024-03-29

## 2024-03-29 RX ADMIN — TRIAMCINOLONE ACETONIDE 200 MG: 40 INJECTION, SUSPENSION INTRA-ARTICULAR; INTRAMUSCULAR at 13:50

## 2024-03-29 NOTE — PROGRESS NOTES
Date: 03/29/24   Name: Dee Kaur    Pre-Procedural Diagnosis:    Diagnosis Orders   1. Lumbar spondylosis  FL INJ LUMB/SAC FACET SINGLE LEVEL    XR INJ FACET LUMB SACRAL 2ND LVL    triamcinolone acetonide (KENALOG-40) injection 200 mg          Procedure: Bilateral Facet Joint Injections (2 levels)    Precautions:  [facility] Precautions spine injections: None.  Patient denies any prior sensitivity to steroid, local anesthetic, contrast dye, iodine or shellfish.    The procedure was discussed at length with the patient and informed consent was signed. The patient was placed in a prone position on the fluoroscopy table and the skin was prepped and draped in a routine sterile fashion. The areas to be injected were each anesthetized with approximately 2-3 cc of 1% Lidocaine. Initially a 22-gauge 3.5 inch spinal needle was carefully advanced under fluoroscopic guidance to the bilateral L4-L5 and L5-S1 facet joint spaces. 1 cc of 0.25% Marcaine and 50 mg of Kenalog were injected through the spinal needle at each site.     Fluoroscopic guidance was used intermittently over a 10-minute period to insure proper needle placement and patient safety. A hard copy of the fluoroscopic  images has been placed in the patient's chart. The patient was monitored after the procedure and discharged home without complication.     A total of 5 cc of Kenalog were administered during this procedure.    Resume Meds:     N/A    L WILFRED GAMBLE JR, MD  03/29/24

## 2024-04-15 ENCOUNTER — TELEPHONE (OUTPATIENT)
Dept: ORTHOPEDIC SURGERY | Age: 79
End: 2024-04-15

## 2024-04-15 NOTE — TELEPHONE ENCOUNTER
Dee is calling to change her appointment on 4/17/24 as she states that she can't come in at the time she is scheduled.  She states that she could come in the afternoon that day, but there isn't an appointment available that day.  All the days that he has available, she is unable to schedule.  She asks for a return call please to try to get the 1st rescheduled please.

## 2024-04-17 ENCOUNTER — TELEPHONE (OUTPATIENT)
Dept: ORTHOPEDIC SURGERY | Age: 79
End: 2024-04-17

## 2024-04-19 ENCOUNTER — OFFICE VISIT (OUTPATIENT)
Dept: ORTHOPEDIC SURGERY | Age: 79
End: 2024-04-19
Payer: MEDICARE

## 2024-04-19 DIAGNOSIS — M47.816 SPONDYLOSIS OF LUMBAR REGION WITHOUT MYELOPATHY OR RADICULOPATHY: Primary | ICD-10-CM

## 2024-04-19 DIAGNOSIS — M54.16 LUMBAR RADICULOPATHY: ICD-10-CM

## 2024-04-19 DIAGNOSIS — M48.061 SPINAL STENOSIS OF LUMBAR REGION WITHOUT NEUROGENIC CLAUDICATION: ICD-10-CM

## 2024-04-19 PROCEDURE — G8428 CUR MEDS NOT DOCUMENT: HCPCS | Performed by: PHYSICAL MEDICINE & REHABILITATION

## 2024-04-19 PROCEDURE — 1090F PRES/ABSN URINE INCON ASSESS: CPT | Performed by: PHYSICAL MEDICINE & REHABILITATION

## 2024-04-19 PROCEDURE — G8400 PT W/DXA NO RESULTS DOC: HCPCS | Performed by: PHYSICAL MEDICINE & REHABILITATION

## 2024-04-19 PROCEDURE — 99214 OFFICE O/P EST MOD 30 MIN: CPT | Performed by: PHYSICAL MEDICINE & REHABILITATION

## 2024-04-19 PROCEDURE — G8417 CALC BMI ABV UP PARAM F/U: HCPCS | Performed by: PHYSICAL MEDICINE & REHABILITATION

## 2024-04-19 PROCEDURE — 1036F TOBACCO NON-USER: CPT | Performed by: PHYSICAL MEDICINE & REHABILITATION

## 2024-04-19 PROCEDURE — 1123F ACP DISCUSS/DSCN MKR DOCD: CPT | Performed by: PHYSICAL MEDICINE & REHABILITATION

## 2024-04-19 NOTE — PROGRESS NOTES
Date:  04/19/24     HPI:  I am seeing eDe Kaur in evalution/folowup.  Office visit of 30 minutes.  I saw her most recently in the office setting in November.  Full details in that note but she does have a diagnosis of fibromyalgia, also possibly rheumatologic condition which she was undergoing evaluation she does have weakness in the lower extremities this been a chronic issue, also buttock pain.  Lately she has had pain in her lower lumbar paraspinal areas which she has had favorable response to facet joint injections, this most recently from 3 weeks ago.    In the past she was noted to have moderate central spinal stenosis on MR imaging, was evaluated by Dr Jose and does not feel the spine surgery was indicated at that time.  So that was at least several years ago.  I do not see records of recent MR imaging of the lumbar spine.    She tells me that she feels that her legs are weaker.  Maybe more proximally when arising from a seated position.  Some trouble with ambulation, though I could not generate a history of consistent claudication such as time to weakness with consistency, etc.  She does complain of rubbery nests in the legs that appears to be most of the time but I am unaware of any falls or injuries.  She has had some swelling in her legs, though does not relate that bad on examination today.  She tells me she was evaluated by her primary physician for this but nothing was done for it as best I can gather.  I told her that leg swelling would not be related issues ! am seeing in her for.    ROS: As per previous notes    PE: Alert and cooperative.  Reasonably good strength in the lower extremities, maybe a touch depressed proximally.  She is exquisitely tender any type of touch of her lower extremities, especially below the knees.  Depressed reflexes.  Tenderness in the lower lumbar paraspinal areas and only mildly so.    Assessment/Plan:       PREDOMINANTLY MUSCULOSKELETAL LUMBOSACRAL  SPINE

## 2024-04-22 ENCOUNTER — TELEPHONE (OUTPATIENT)
Age: 79
End: 2024-04-22

## 2024-04-22 RX ORDER — TORSEMIDE 5 MG/1
5 TABLET ORAL DAILY PRN
Qty: 30 TABLET | Refills: 3 | Status: SHIPPED | OUTPATIENT
Start: 2024-04-22

## 2024-04-22 NOTE — TELEPHONE ENCOUNTER
Aniceto Davis MD  You6 minutes ago (11:11 AM)       Torsemide 5, # 30, si q day as needed.  Ref: 3

## 2024-04-22 NOTE — TELEPHONE ENCOUNTER
Pt.is reporting her ankles started swelling about a week ago and can not get the swelling off.Does go down a little at night but not all the way.She is not SOB and has lost 20 pounds.No new meds.She is only on Toprol 12.5mg qday.PCP reduced dose of Toprol to 1/2 tab./90's HR.She denies dietary indiscretion.Metformin and Trazodone recently increased.Legs weaker in past few weeks.She has never had swelling in ankles before so this is new symptoms.Please advise..

## 2024-04-22 NOTE — TELEPHONE ENCOUNTER
Patient has been experiencing swelling in her ankles off an on for the past week and she is not sure what she should do.

## 2024-04-22 NOTE — TELEPHONE ENCOUNTER
I called and informed pt.of MD response and she v/u.I escribed med as below:  Requested Prescriptions     Signed Prescriptions Disp Refills    torsemide (DEMADEX) 5 MG tablet 30 tablet 3     Sig: Take 1 tablet by mouth daily as needed (for swelling)     Authorizing Provider: GEOVANNY FAY     Ordering User: YUNIER CARDONA     Pt.wants to know what's wrong wants an appt.Appt.made tomorrow w/ tomorrow at 4:30.

## 2024-04-23 ENCOUNTER — OFFICE VISIT (OUTPATIENT)
Age: 79
End: 2024-04-23
Payer: MEDICARE

## 2024-04-23 VITALS
HEIGHT: 66 IN | DIASTOLIC BLOOD PRESSURE: 88 MMHG | BODY MASS INDEX: 25.05 KG/M2 | HEART RATE: 85 BPM | SYSTOLIC BLOOD PRESSURE: 138 MMHG | WEIGHT: 155.9 LBS

## 2024-04-23 DIAGNOSIS — I47.10 SVT (SUPRAVENTRICULAR TACHYCARDIA) (HCC): Chronic | ICD-10-CM

## 2024-04-23 DIAGNOSIS — E11.9 CONTROLLED TYPE 2 DIABETES MELLITUS WITHOUT COMPLICATION, WITHOUT LONG-TERM CURRENT USE OF INSULIN (HCC): Chronic | ICD-10-CM

## 2024-04-23 DIAGNOSIS — M79.89 LEG SWELLING: Primary | ICD-10-CM

## 2024-04-23 PROCEDURE — 1036F TOBACCO NON-USER: CPT | Performed by: INTERNAL MEDICINE

## 2024-04-23 PROCEDURE — G8427 DOCREV CUR MEDS BY ELIG CLIN: HCPCS | Performed by: INTERNAL MEDICINE

## 2024-04-23 PROCEDURE — 99214 OFFICE O/P EST MOD 30 MIN: CPT | Performed by: INTERNAL MEDICINE

## 2024-04-23 PROCEDURE — 1123F ACP DISCUSS/DSCN MKR DOCD: CPT | Performed by: INTERNAL MEDICINE

## 2024-04-23 PROCEDURE — G8400 PT W/DXA NO RESULTS DOC: HCPCS | Performed by: INTERNAL MEDICINE

## 2024-04-23 PROCEDURE — G8417 CALC BMI ABV UP PARAM F/U: HCPCS | Performed by: INTERNAL MEDICINE

## 2024-04-23 PROCEDURE — 1090F PRES/ABSN URINE INCON ASSESS: CPT | Performed by: INTERNAL MEDICINE

## 2024-04-23 ASSESSMENT — ENCOUNTER SYMPTOMS: SHORTNESS OF BREATH: 0

## 2024-04-23 NOTE — PROGRESS NOTES
and lipids. Continue home regimen.  -     Hemoglobin A1C; Future  -     Lipid Panel; Future         F/u Dr. Davis 6/12/24          Thank you for allowing me to participate in this patient's care.  Please call or contact me if there are any questions or concerns regarding the above.      Ventura Ann,   04/23/24  4:39 PM

## 2024-04-24 ENCOUNTER — OFFICE VISIT (OUTPATIENT)
Dept: ORTHOPEDIC SURGERY | Age: 79
End: 2024-04-24
Payer: MEDICARE

## 2024-04-24 DIAGNOSIS — M17.11 PRIMARY OSTEOARTHRITIS OF RIGHT KNEE: Primary | ICD-10-CM

## 2024-04-24 DIAGNOSIS — M17.12 PRIMARY OSTEOARTHRITIS OF LEFT KNEE: ICD-10-CM

## 2024-04-24 PROCEDURE — 20611 DRAIN/INJ JOINT/BURSA W/US: CPT | Performed by: PHYSICIAN ASSISTANT

## 2024-04-24 RX ORDER — HYALURONATE SODIUM 10 MG/ML
20 SYRINGE (ML) INTRAARTICULAR ONCE
Status: COMPLETED | OUTPATIENT
Start: 2024-04-24 | End: 2024-04-24

## 2024-04-24 RX ADMIN — Medication 20 MG: at 10:27

## 2024-04-24 RX ADMIN — Medication 20 MG: at 10:26

## 2024-04-24 NOTE — PROGRESS NOTES
Name: Dee Kaur  YOB: 1945  Gender: female  MRN: [id number]     CC: BILATERAL Knee Osteoarthritis      PROCEDURE: #1 of 3 Hyaluronic Injection    After discussion of risks and benefits including but not limited to pain, infection, skin discoloration, and injury to blood vessels or nerves the patient verbally consented to proceed with injection of the BILATERAL.  The patient is to restrict their activity for 48 hours.    Radiology Report: US guidance was used to examine the joint, ensure adequate needle placement and to decrease the risk of joint aggravation. The intracondylar notch, retropatellar fat pad, patella tendon, patella and tibia were all visualized. Pre and post injection US still images were obtained and placed in the record. Image were obtained with a [employer] ultrasound transducer (model [id number]).    Procedure Note: After steriley prepping the BILATERAL knee, it was injected with a 2mL of Euflexxa and the medication was observed going into the intra-articular space via US guidance.    The patient tolerated the procedure without difficulty.    MATTHEW Kidd  
General
General

## 2024-05-01 ENCOUNTER — OFFICE VISIT (OUTPATIENT)
Dept: ORTHOPEDIC SURGERY | Age: 79
End: 2024-05-01
Payer: MEDICARE

## 2024-05-01 DIAGNOSIS — M17.12 PRIMARY OSTEOARTHRITIS OF LEFT KNEE: ICD-10-CM

## 2024-05-01 DIAGNOSIS — M17.11 PRIMARY OSTEOARTHRITIS OF RIGHT KNEE: Primary | ICD-10-CM

## 2024-05-01 PROCEDURE — 20611 DRAIN/INJ JOINT/BURSA W/US: CPT | Performed by: PHYSICIAN ASSISTANT

## 2024-05-01 RX ORDER — HYALURONATE SODIUM 10 MG/ML
20 SYRINGE (ML) INTRAARTICULAR ONCE
Status: COMPLETED | OUTPATIENT
Start: 2024-05-01 | End: 2024-05-01

## 2024-05-01 RX ADMIN — Medication 20 MG: at 10:15

## 2024-05-01 NOTE — PROGRESS NOTES
Name: Dee Kaur  YOB: 1945  Gender: female  MRN: [id number]     CC: BILATERAL Knee Osteoarthritis      PROCEDURE: #2 of 3 Hyaluronic Injection    After discussion of risks and benefits including but not limited to pain, infection, skin discoloration, and injury to blood vessels or nerves the patient verbally consented to proceed with injection of the BILATERAL.  The patient is to restrict their activity for 48 hours.    Radiology Report: US guidance was used to examine the joint, ensure adequate needle placement and to decrease the risk of joint aggravation. The intracondylar notch, retropatellar fat pad, patella tendon, patella and tibia were all visualized. Pre and post injection US still images were obtained and placed in the record. Image were obtained with a [employer] ultrasound transducer (model [id number]).    Procedure Note: After steriley prepping the BILATERAL knee, it was injected with a 2mL of Euflexxa and the medication was observed going into the intra-articular space via US guidance.    The patient tolerated the procedure without difficulty.    MATTHEW Kidd

## 2024-05-10 ENCOUNTER — OFFICE VISIT (OUTPATIENT)
Dept: ORTHOPEDIC SURGERY | Age: 79
End: 2024-05-10

## 2024-05-10 DIAGNOSIS — M20.21 HALLUX RIGIDUS OF BOTH FEET: ICD-10-CM

## 2024-05-10 DIAGNOSIS — M25.571 RIGHT ANKLE PAIN, UNSPECIFIED CHRONICITY: ICD-10-CM

## 2024-05-10 DIAGNOSIS — M79.671 RIGHT FOOT PAIN: Primary | ICD-10-CM

## 2024-05-10 DIAGNOSIS — M20.22 HALLUX RIGIDUS OF BOTH FEET: ICD-10-CM

## 2024-05-10 DIAGNOSIS — M19.072 PRIMARY OSTEOARTHRITIS OF LEFT ANKLE: ICD-10-CM

## 2024-05-10 DIAGNOSIS — M79.672 LEFT FOOT PAIN: ICD-10-CM

## 2024-05-10 DIAGNOSIS — M20.5X2 ACQUIRED CLAW TOE OF LEFT FOOT: ICD-10-CM

## 2024-05-10 DIAGNOSIS — M20.5X1 ACQUIRED CLAW TOE OF RIGHT FOOT: ICD-10-CM

## 2024-05-10 NOTE — PROGRESS NOTES
Name: Dee Kaur  YOB: 1945  Gender: female  MRN: [id number]    CC: Foot pain: Ankle pain:    HPI:   05/10/2024: Initial visit: Left ankle pain: Bilateral forefoot pain:    ROS/Meds/PSH/PMH/FH/SH: reviewed today    Tobacco:  reports that she has quit smoking. She has been exposed to tobacco smoke. She has never used smokeless tobacco.     Physical Examination:  Patient appears to be alert and oriented with acceptable appearance.  No obvious distress or SOB  CV: appears to have acceptable vascular color and capillary refill  Neuro: appears to have mostly intact light touch sensation   Skin: Bilateral = PIP thickening; atrophic fat pads  MS: Standing: Bilateral tibial 2-5 claw toes: Gait age-related  Bilateral = forefoot 2-4 metatarsalgia; MTP pain  Bilateral = rigid asymptomatic great toes  Bilateral = asymptomatic mid feet  Bilateral = 5/5 strength; no instability or crepitance  Left ankle = mild ankle pain with stress testing    XR: Right: Left: Standing AP lateral mortise ankle plus AP oblique feet taken today with:   Bilateral: Hallux rigidus; claw toes: naviculocuneiform/tarsometatarsal arthritis sagging  Left: ankle valgus collapse arthritis; subtalar arthritis;  XR Impression:  As above      Reviewed Test/Records/Documents:   03/03/2023: Treated: Bilateral forefoot concerns related to claw toes: First MTP arthritis  03/22/2024: Treated: Bilateral shoulder glenohumeral/AC joint OA, rotator cuff deficiencies  04/19/2024: Dr. Mederos: Central spinal stenosis  05/01/2024: MATTHEW Marroquin: Hyaluronic knee injections  Injection: Discussed    Assessment:    Bilateral hallux rigidus, claw toes, naviculocuneiform/tarsometatarsal arthritis  Left valgus impacted ankle osteoarthritis    Plan:   The patient and I discussed the above assessment. We explored treatment options.     She reports bilateral LE edema that improved with cardiac medication [??  Diuretic]  05/10/2024: Presents to

## 2024-05-15 ENCOUNTER — OFFICE VISIT (OUTPATIENT)
Dept: ORTHOPEDIC SURGERY | Age: 79
End: 2024-05-15
Payer: MEDICARE

## 2024-05-15 DIAGNOSIS — M17.12 PRIMARY OSTEOARTHRITIS OF LEFT KNEE: ICD-10-CM

## 2024-05-15 DIAGNOSIS — M17.11 PRIMARY OSTEOARTHRITIS OF RIGHT KNEE: Primary | ICD-10-CM

## 2024-05-15 RX ORDER — HYALURONATE SODIUM 10 MG/ML
20 SYRINGE (ML) INTRAARTICULAR ONCE
Status: COMPLETED | OUTPATIENT
Start: 2024-05-15 | End: 2024-05-15

## 2024-05-15 RX ADMIN — Medication 20 MG: at 13:41

## 2024-05-15 NOTE — PROGRESS NOTES
Name: Dee Kaur  YOB: 1945  Gender: female  MRN: [id number]     CC: BILATERAL Knee Osteoarthritis      PROCEDURE: #3 of 3 Hyaluronic Injection    After discussion of risks and benefits including but not limited to pain, infection, skin discoloration, and injury to blood vessels or nerves the patient verbally consented to proceed with injection of the BILATERAL.  The patient is to restrict their activity for 48 hours.    Radiology Report: US guidance was used to examine the joint, ensure adequate needle placement and to decrease the risk of joint aggravation. The intracondylar notch, retropatellar fat pad, patella tendon, patella and tibia were all visualized. Pre and post injection US still images were obtained and placed in the record. Image were obtained with a [employer] ultrasound transducer (model [id number]).    Procedure Note: After steriley prepping the BILATERAL knee, it was injected with a 2mL of Euflexxa and the medication was observed going into the intra-articular space via US guidance.    The patient tolerated the procedure without difficulty.    MATTHEW Kidd

## 2024-06-21 ENCOUNTER — TELEPHONE (OUTPATIENT)
Dept: ORTHOPEDIC SURGERY | Age: 79
End: 2024-06-21

## 2024-06-21 DIAGNOSIS — M47.816 LUMBAR SPONDYLOSIS: Primary | ICD-10-CM

## 2024-06-21 NOTE — TELEPHONE ENCOUNTER
Called and scheduled patient for repeat injections with [facility]  6/26 ES 10:15am

## 2024-06-26 ENCOUNTER — OFFICE VISIT (OUTPATIENT)
Dept: ORTHOPEDIC SURGERY | Age: 79
End: 2024-06-26
Payer: MEDICARE

## 2024-06-26 DIAGNOSIS — M47.816 LUMBAR SPONDYLOSIS: Primary | ICD-10-CM

## 2024-06-26 PROCEDURE — 64493 INJ PARAVERT F JNT L/S 1 LEV: CPT | Performed by: PHYSICAL MEDICINE & REHABILITATION

## 2024-06-26 PROCEDURE — 64494 INJ PARAVERT F JNT L/S 2 LEV: CPT | Performed by: PHYSICAL MEDICINE & REHABILITATION

## 2024-06-26 RX ORDER — TRIAMCINOLONE ACETONIDE 40 MG/ML
200 INJECTION, SUSPENSION INTRA-ARTICULAR; INTRAMUSCULAR ONCE
Status: COMPLETED | OUTPATIENT
Start: 2024-06-26 | End: 2024-06-26

## 2024-06-26 RX ADMIN — TRIAMCINOLONE ACETONIDE 200 MG: 40 INJECTION, SUSPENSION INTRA-ARTICULAR; INTRAMUSCULAR at 10:28

## 2024-06-26 NOTE — PROGRESS NOTES
Date: 06/26/24   Name: Dee Kaur    Pre-Procedural Diagnosis:    Diagnosis Orders   1. Lumbar spondylosis  triamcinolone acetonide (KENALOG-40) injection 200 mg    FL INJ LUMB/SAC FACET SINGLE LEVEL    XR INJ FACET LUMB SACRAL 2ND LVL      Reviewed lumbar spine radiographs that reveal 5 non rib-bearing lumbar vertebrae.    Procedure: Bilateral Facet Joint Injections (2 levels)    Precautions:  [facility] Precautions spine injections: None.  Patient denies any prior sensitivity to steroid, local anesthetic, contrast dye, iodine or shellfish.    The procedure was discussed at length with the patient and informed consent was signed. The patient was placed in a prone position on the fluoroscopy table and the skin was prepped and draped in a routine sterile fashion. The areas to be injected were each anesthetized with approximately 2-3 cc of 1% Lidocaine. Initially a 22-gauge 3.5 inch spinal needle was carefully advanced under fluoroscopic guidance to the bilateral L4-L5 and L5-S1 facet joint spaces. 1 cc of 0.25% Marcaine and 50 mg of Kenalog were injected through the spinal needle at each site.     Fluoroscopic guidance was used intermittently over a 10-minute period to insure proper needle placement and patient safety. A hard copy of the fluoroscopic  images has been placed in the patient's chart. The patient was monitored after the procedure and discharged home without complication.     A total of 5 cc of Kenalog were administered during this procedure.    Resume Meds:     N/A    L WILFRED GAMBLE JR, MD  06/26/24

## 2024-07-03 ENCOUNTER — TELEPHONE (OUTPATIENT)
Dept: ORTHOPEDIC SURGERY | Age: 79
End: 2024-07-03

## 2024-07-03 RX ORDER — METOPROLOL SUCCINATE 25 MG/1
25 TABLET, EXTENDED RELEASE ORAL DAILY
Qty: 90 TABLET | Refills: 1 | Status: SHIPPED | OUTPATIENT
Start: 2024-07-03

## 2024-07-03 NOTE — TELEPHONE ENCOUNTER
Called pt to confirm which body part she wanted RC at her upcoming apt with [facility]. Pt stated she has the apt for bilateral shoulder. Pt stated she may cancel as she just had a back injection that is also helping her shoulder pain. She will keep apt for now and cancel closer to apt date if no longer needed.

## 2024-07-03 NOTE — TELEPHONE ENCOUNTER
Requested Prescriptions     Pending Prescriptions Disp Refills    metoprolol succinate (TOPROL XL) 25 MG extended release tablet [Pharmacy Med Name: METOPROLOL SUCC ER 25 MG TAB] 90 tablet 1     Sig: Take 1 tablet by mouth daily     Verified rx. Last OV 4/23/24. Erx to pharm on file.

## 2024-07-07 NOTE — PROGRESS NOTES
[facility] CARDIOLOGY  [address] [place] [address], [address]  [place] [address]  PHONE: [phone]        24        NAME:  Dee Kaur  : 1945  MRN: [id number]     Palpitation  Malaise and fatigue  Type 2 diabetic    CHIEF COMPLAINT:    Palpitations      SUBJECTIVE:     No chest pain or palpitation or dizziness.  Fatigue persists.  A1c 11 range       Medications were all reviewed with the patient today and updated as necessary.   Current Outpatient Medications   Medication Sig    metoprolol succinate (TOPROL XL) 25 MG extended release tablet Take 1 tablet by mouth daily    torsemide (DEMADEX) 5 MG tablet Take 1 tablet by mouth daily as needed (for swelling)    glipiZIDE (GLUCOTROL) 10 MG tablet Take 1 tablet by mouth daily    DULoxetine (CYMBALTA) 20 MG extended release capsule Take 1 capsule by mouth daily    metFORMIN (GLUCOPHAGE) 500 MG tablet Take 1 tablet by mouth 3 times daily    zolpidem (AMBIEN) 10 MG tablet 0.5 tablets nightly as needed.    omeprazole (PRILOSEC) 20 MG delayed release capsule Take 1 capsule by mouth Daily    rosuvastatin (CRESTOR) 10 MG tablet Take 1 tablet by mouth every other day    ketorolac (ACULAR) 0.5 % ophthalmic solution  (Patient not taking: Reported on 2024)    OZEMPIC, 0.25 OR 0.5 MG/DOSE, 2 MG/3ML SOPN  (Patient not taking: Reported on 2024)    RIGHTEST  GLUCOSE TEST strip USE AS DIRECTED ONE TIME DAILY    PX Lancets Ultra Thin 28G MISC USE TO CHECK BLOOD SUGAR ONE TIME DAILY     No current facility-administered medications for this visit.        Allergies   Allergen Reactions    Gabapentin Other (See Comments)     Jerking  Other reaction(s): Other (see comments)  Jerking    Tizanidine Other (See Comments)     Neck jerks  Other reaction(s): Other- (not listed) - Allergy  Neck jerks  Other reaction(s): Other (see comments)  Neck jerks    Neck jerks Other reaction(s): Other- (not listed) - Allergy Neck jerks           PHYSICAL EXAM:     Wt

## 2024-07-08 ENCOUNTER — OFFICE VISIT (OUTPATIENT)
Age: 79
End: 2024-07-08
Payer: MEDICARE

## 2024-07-08 VITALS
HEART RATE: 108 BPM | SYSTOLIC BLOOD PRESSURE: 142 MMHG | DIASTOLIC BLOOD PRESSURE: 98 MMHG | BODY MASS INDEX: 23.67 KG/M2 | HEIGHT: 67 IN | WEIGHT: 150.8 LBS

## 2024-07-08 DIAGNOSIS — R00.2 PALPITATION: Primary | ICD-10-CM

## 2024-07-08 DIAGNOSIS — E11.65 TYPE 2 DIABETES MELLITUS WITH HYPERGLYCEMIA, WITHOUT LONG-TERM CURRENT USE OF INSULIN (HCC): ICD-10-CM

## 2024-07-08 DIAGNOSIS — I10 PRIMARY HYPERTENSION: ICD-10-CM

## 2024-07-08 DIAGNOSIS — R53.83 MALAISE AND FATIGUE: ICD-10-CM

## 2024-07-08 DIAGNOSIS — R53.81 MALAISE AND FATIGUE: ICD-10-CM

## 2024-07-08 PROCEDURE — 93000 ELECTROCARDIOGRAM COMPLETE: CPT | Performed by: INTERNAL MEDICINE

## 2024-07-08 PROCEDURE — G8420 CALC BMI NORM PARAMETERS: HCPCS | Performed by: INTERNAL MEDICINE

## 2024-07-08 PROCEDURE — G8400 PT W/DXA NO RESULTS DOC: HCPCS | Performed by: INTERNAL MEDICINE

## 2024-07-08 PROCEDURE — 3077F SYST BP >= 140 MM HG: CPT | Performed by: INTERNAL MEDICINE

## 2024-07-08 PROCEDURE — G8427 DOCREV CUR MEDS BY ELIG CLIN: HCPCS | Performed by: INTERNAL MEDICINE

## 2024-07-08 PROCEDURE — 1090F PRES/ABSN URINE INCON ASSESS: CPT | Performed by: INTERNAL MEDICINE

## 2024-07-08 PROCEDURE — 1123F ACP DISCUSS/DSCN MKR DOCD: CPT | Performed by: INTERNAL MEDICINE

## 2024-07-08 PROCEDURE — 99214 OFFICE O/P EST MOD 30 MIN: CPT | Performed by: INTERNAL MEDICINE

## 2024-07-08 PROCEDURE — 1036F TOBACCO NON-USER: CPT | Performed by: INTERNAL MEDICINE

## 2024-07-08 PROCEDURE — 3080F DIAST BP >= 90 MM HG: CPT | Performed by: INTERNAL MEDICINE

## 2024-07-15 ENCOUNTER — TELEPHONE (OUTPATIENT)
Dept: ORTHOPEDIC SURGERY | Age: 79
End: 2024-07-15

## 2024-07-15 NOTE — TELEPHONE ENCOUNTER
Called and spoke to pt and explained that DME items can only be return within 15 days if they are defective.

## 2024-07-15 NOTE — TELEPHONE ENCOUNTER
Patient wants Dr Ness to write a letter that she didn't need to be charge for inserts, never used them. Tried to bring them back got bill , ?

## 2024-07-31 ENCOUNTER — OFFICE VISIT (OUTPATIENT)
Dept: ORTHOPEDIC SURGERY | Age: 79
End: 2024-07-31

## 2024-07-31 DIAGNOSIS — M25.571 RIGHT ANKLE PAIN, UNSPECIFIED CHRONICITY: ICD-10-CM

## 2024-07-31 DIAGNOSIS — M79.672 LEFT FOOT PAIN: ICD-10-CM

## 2024-07-31 DIAGNOSIS — M20.21 HALLUX RIGIDUS OF BOTH FEET: ICD-10-CM

## 2024-07-31 DIAGNOSIS — M79.671 RIGHT FOOT PAIN: Primary | ICD-10-CM

## 2024-07-31 DIAGNOSIS — M20.22 HALLUX RIGIDUS OF BOTH FEET: ICD-10-CM

## 2024-07-31 DIAGNOSIS — M20.5X1 ACQUIRED CLAW TOE OF RIGHT FOOT: ICD-10-CM

## 2024-07-31 DIAGNOSIS — M20.5X2 ACQUIRED CLAW TOE OF LEFT FOOT: ICD-10-CM

## 2024-07-31 NOTE — PROGRESS NOTES
Name: Dee Kaur  YOB: 1945  Gender: female  MRN: [id number]    CC: Foot pain    HPI:   05/10/2024: Initial visit: Left ankle pain: Bilateral forefoot pain:  07/31/2024: Bilateral foot pain    ROS/Meds/PSH/PMH/FH/SH: reviewed today    Tobacco:  reports that she has quit smoking. She has been exposed to tobacco smoke. She has never used smokeless tobacco.     Physical Examination:  Patient appears to be alert and oriented with acceptable appearance.  No obvious distress or SOB  CV: appears to have acceptable vascular color and capillary refill  Neuro: appears to have mostly intact light touch sensation   Skin: Bilateral = PIP thickening; atrophic fat pads: Left = fifth metatarsal head IPK with no signs of infection or ulceration  MS: Standing: Bilateral tibial 2-5 claw toes: Gait age-related  Left = fifth metatarsal head IPK area pain  Bilateral = forefoot 2-4 metatarsalgia; MTP pain  Bilateral = rigid asymptomatic great toes  Bilateral = asymptomatic mid feet  Bilateral = 5/5 strength; no instability or crepitance     XR: Right: Left: Standing AP lateral mortise ankle plus AP oblique feet taken today with:   Bilateral = hallux rigidus; claw toes: naviculocuneiform/tarsometatarsal arthritis sagging   Left = ankle valgus collapse arthritis; subtalar arthritis;  XR Impression:  As above      Reviewed Test/Records/Documents:   03/03/2023: Treated: Bilateral forefoot concerns related to claw toes: First MTP arthritis  03/22/2024: Treated: Bilateral shoulder glenohumeral/AC joint OA, rotator cuff deficiencies  04/19/2024: Dr. Mederos: Central spinal stenosis  05/01/2024: Mr. Damon Lizama PA: Bilateral hyaluronic knee injections  05/15/2024: Mr. Damon Lizama PA: Bilateral hyaluronic knee injections  06/26/2024: Dr. Mederos: Facet injections  Injection: Not applicable related to her complaints    Assessment:    Left fifth metatarsal head IPK  Bilateral hallux rigidus, claw toes,

## 2024-08-27 ENCOUNTER — TELEPHONE (OUTPATIENT)
Dept: ORTHOPEDIC SURGERY | Age: 79
End: 2024-08-27

## 2024-08-28 ENCOUNTER — TELEPHONE (OUTPATIENT)
Dept: ORTHOPEDIC SURGERY | Age: 79
End: 2024-08-28

## 2024-08-28 NOTE — TELEPHONE ENCOUNTER
Called and left message for pt informing we do not have anything sooner at this time, but if we get a cancellation for tomorrow or Friday I will call her.

## 2024-08-29 NOTE — TELEPHONE ENCOUNTER
Called and spoke with pt and informed her we have not had any cancellation as of yet. Pt voiced understanding.

## 2024-09-03 ENCOUNTER — OFFICE VISIT (OUTPATIENT)
Dept: ORTHOPEDIC SURGERY | Age: 79
End: 2024-09-03
Payer: MEDICARE

## 2024-09-03 VITALS — WEIGHT: 150 LBS | BODY MASS INDEX: 23.54 KG/M2 | HEIGHT: 67 IN

## 2024-09-03 DIAGNOSIS — M25.512 BILATERAL SHOULDER PAIN, UNSPECIFIED CHRONICITY: ICD-10-CM

## 2024-09-03 DIAGNOSIS — M19.012 ARTHRITIS OF BOTH SHOULDER REGIONS: Primary | ICD-10-CM

## 2024-09-03 DIAGNOSIS — M25.511 BILATERAL SHOULDER PAIN, UNSPECIFIED CHRONICITY: ICD-10-CM

## 2024-09-03 DIAGNOSIS — M19.011 ARTHRITIS OF BOTH SHOULDER REGIONS: Primary | ICD-10-CM

## 2024-09-03 PROCEDURE — G8400 PT W/DXA NO RESULTS DOC: HCPCS | Performed by: ORTHOPAEDIC SURGERY

## 2024-09-03 PROCEDURE — 99213 OFFICE O/P EST LOW 20 MIN: CPT | Performed by: ORTHOPAEDIC SURGERY

## 2024-09-03 PROCEDURE — G8428 CUR MEDS NOT DOCUMENT: HCPCS | Performed by: ORTHOPAEDIC SURGERY

## 2024-09-03 PROCEDURE — 1090F PRES/ABSN URINE INCON ASSESS: CPT | Performed by: ORTHOPAEDIC SURGERY

## 2024-09-03 PROCEDURE — G8420 CALC BMI NORM PARAMETERS: HCPCS | Performed by: ORTHOPAEDIC SURGERY

## 2024-09-03 PROCEDURE — 1036F TOBACCO NON-USER: CPT | Performed by: ORTHOPAEDIC SURGERY

## 2024-09-03 PROCEDURE — 1123F ACP DISCUSS/DSCN MKR DOCD: CPT | Performed by: ORTHOPAEDIC SURGERY

## 2024-09-04 ENCOUNTER — TELEPHONE (OUTPATIENT)
Dept: ORTHOPEDIC SURGERY | Age: 79
End: 2024-09-04

## 2024-09-04 NOTE — TELEPHONE ENCOUNTER
Angela khanna/ [facility] [phone] If need, called says Hilaria MIRELES says is ok for patient to get a inject, will raise her blood sugar some but should be ok

## 2024-09-04 NOTE — TELEPHONE ENCOUNTER
Called and spoke with pt. Pt was scheduled for her injections and was informed Dr. Ness will only do one shoulder at a time. Pt was scheduled for 09/12/24 @ 100 at [address] and 09/19/24 @ 10[address] at [address]. Pt voiced understanding.

## 2024-09-05 ENCOUNTER — TELEPHONE (OUTPATIENT)
Dept: ORTHOPEDIC SURGERY | Age: 79
End: 2024-09-05

## 2024-09-05 ENCOUNTER — OFFICE VISIT (OUTPATIENT)
Dept: ORTHOPEDIC SURGERY | Age: 79
End: 2024-09-05

## 2024-09-05 VITALS — BODY MASS INDEX: 23.54 KG/M2 | WEIGHT: 150 LBS | HEIGHT: 67 IN

## 2024-09-05 DIAGNOSIS — M19.011 ARTHRITIS OF BOTH SHOULDER REGIONS: Primary | ICD-10-CM

## 2024-09-05 DIAGNOSIS — M19.012 ARTHRITIS OF BOTH SHOULDER REGIONS: Primary | ICD-10-CM

## 2024-09-05 RX ORDER — METHYLPREDNISOLONE ACETATE 40 MG/ML
40 INJECTION, SUSPENSION INTRA-ARTICULAR; INTRALESIONAL; INTRAMUSCULAR; SOFT TISSUE ONCE
Status: COMPLETED | OUTPATIENT
Start: 2024-09-05 | End: 2024-09-05

## 2024-09-05 RX ADMIN — METHYLPREDNISOLONE ACETATE 80 MG: 40 INJECTION, SUSPENSION INTRA-ARTICULAR; INTRALESIONAL; INTRAMUSCULAR; SOFT TISSUE at 14:36

## 2024-09-05 NOTE — PROGRESS NOTES
I did not feel comfortable with injections  09/04/2024: Aniya took a phone call from Angela at [facility] internal medicine: According to nurse practitioner Hilaria,\" it is okay for patient to get injection, will raise blood sugar some but should be okay\"    Timeout: I confirmed with the patient and Aniya:   She understands Depo-Medrol and glucose  She reports this morning glucose 138  She acknowledges her conversation with her provider at [facility]   All are in agreement with: Correct patient, planned procedure, procedure site and laterality: Left shoulder joint  Injection: We discussed risks/complications of injection: After acceptance and sterile prep: Left shoulder joint injected 2 cc of Xylocaine, 80 mg Depo-Medrol; she appeared to do well                 Assessment:    Bilateral shoulder glenohumeral/acromioclavicular osteoarthritis, rotator cuff deficiency    Plan:   The patient and I discussed the above assessment. We explored treatment options.     09/03/2024: Initial visit: Notations:  Presents with recurrent chronic bilateral recurrent shoulder glenohumeral/acromioclavicular arthritis, rotator cuff deficiency pain  She is not interested in shoulder surgery, but requested shoulder injections.    Unfortunately with her ED visit this morning, she needs medical treatment first  Once she is stable from all of her medical concerns especially elevated glucose, tremors, and leg wound, consider injection    09/05/2024:  Aniya translated phone conversation with [facility] reflecting that it was safe for her to get an injection  Injection performed today in her more symptomatic left shoulder   I recommend injection of only 1 shoulder at a time from a safety standpoint related to her glucose  No indication today for x-rays   Advanced medical imaging: No indication for CT scan or MRI scan  We discussed shoulder care and I went over details of shoulder protection  PT: No indication     Medication - OTC meds prn:   Surgical

## 2024-09-05 NOTE — TELEPHONE ENCOUNTER
Called and spoke with pt. Pt was scheduled to come into office today 09/05/24 @ [address] at [address]. Pt voiced understanding.

## 2024-09-12 ENCOUNTER — OFFICE VISIT (OUTPATIENT)
Dept: ORTHOPEDIC SURGERY | Age: 79
End: 2024-09-12

## 2024-09-12 DIAGNOSIS — M25.511 CHRONIC RIGHT SHOULDER PAIN: Primary | ICD-10-CM

## 2024-09-12 DIAGNOSIS — G89.29 CHRONIC RIGHT SHOULDER PAIN: Primary | ICD-10-CM

## 2024-09-12 DIAGNOSIS — M25.511 BILATERAL SHOULDER PAIN, UNSPECIFIED CHRONICITY: ICD-10-CM

## 2024-09-12 DIAGNOSIS — M25.512 BILATERAL SHOULDER PAIN, UNSPECIFIED CHRONICITY: ICD-10-CM

## 2024-09-12 DIAGNOSIS — M19.012 ARTHRITIS OF BOTH SHOULDER REGIONS: ICD-10-CM

## 2024-09-12 DIAGNOSIS — M19.011 ARTHRITIS OF BOTH SHOULDER REGIONS: ICD-10-CM

## 2024-09-12 RX ORDER — METHYLPREDNISOLONE ACETATE 40 MG/ML
40 INJECTION, SUSPENSION INTRA-ARTICULAR; INTRALESIONAL; INTRAMUSCULAR; SOFT TISSUE ONCE
Status: COMPLETED | OUTPATIENT
Start: 2024-09-12 | End: 2024-09-12

## 2024-09-12 RX ADMIN — METHYLPREDNISOLONE ACETATE 40 MG: 40 INJECTION, SUSPENSION INTRA-ARTICULAR; INTRALESIONAL; INTRAMUSCULAR; SOFT TISSUE at 12:32

## 2025-01-30 ENCOUNTER — TELEPHONE (OUTPATIENT)
Age: 80
End: 2025-01-30

## 2025-01-30 NOTE — TELEPHONE ENCOUNTER
Pt stated her toes turned purple and it goes away but she gets swelling in her feet. Pt stated it happened last night. She thinks may be lack of circulation. Pt declined any other symptoms. Pt wanted to see what  recommends.

## 2025-01-30 NOTE — TELEPHONE ENCOUNTER
Patient states that last night her toes turned red and then purple. Top of foot around to heel stayed red, feet swollen, throbbing, bone on ankle was swollen. This has not happened before. Spoke with arthritis doctor, said it had to do with circulation. Patient was wanting to know if this was something that Dr. Davis would address.

## 2025-03-13 ENCOUNTER — TELEPHONE (OUTPATIENT)
Dept: ORTHOPEDIC SURGERY | Age: 80
End: 2025-03-13

## 2025-03-13 NOTE — TELEPHONE ENCOUNTER
Pt called she need to be seen asap/she fell  Injury her back.can he see her asap, she said because of lot pain.

## 2025-03-14 ENCOUNTER — OFFICE VISIT (OUTPATIENT)
Dept: ORTHOPEDIC SURGERY | Age: 80
End: 2025-03-14

## 2025-03-14 DIAGNOSIS — M54.50 LUMBAR BACK PAIN: ICD-10-CM

## 2025-03-14 DIAGNOSIS — R07.81 RIB PAIN ON RIGHT SIDE: ICD-10-CM

## 2025-03-14 DIAGNOSIS — M47.816 SPONDYLOSIS OF LUMBAR REGION WITHOUT MYELOPATHY OR RADICULOPATHY: Primary | ICD-10-CM

## 2025-03-14 RX ORDER — TRIAMCINOLONE ACETONIDE 40 MG/ML
120 INJECTION, SUSPENSION INTRA-ARTICULAR; INTRAMUSCULAR ONCE
Status: COMPLETED | OUTPATIENT
Start: 2025-03-14 | End: 2025-03-14

## 2025-03-14 RX ADMIN — TRIAMCINOLONE ACETONIDE 120 MG: 40 INJECTION, SUSPENSION INTRA-ARTICULAR; INTRAMUSCULAR at 09:23

## 2025-03-14 NOTE — PROGRESS NOTES
Date:  03/14/25      Diagnosis Orders   1. Spondylosis of lumbar region without myelopathy or radiculopathy        2. Lumbar back pain        3. Rib pain on right side            HPI:  I am seeing Dee Kaur in evalution/folowup.  Extended office visit to evaluate a new problem.  I typically see her for lumbar spine pain, mostly nonradiating.  She has had issues with weakness in lower extremities, MR imaging the past has revealed some degree of central spinal stenosis.  She had spine surgical consultation at 1 time, believe this over several years ago.  It was not felt the spine surgical options were indicated.    She has had some issues with weakness in the legs and we have worked through that and right now appears to be a stable issue or at least not think she is worried about.  I certainly do not get a history of progressive gait disturbance.    She did have bilateral lumbar facet injections in June of those offered significant benefit for 8 months or more.  Sounds like that pain might be coming back a little bit.    She has had an acute event.  She was at a doctor's office early this week and was in the bathroom offering a urine sample.  She had a spell of vertigo and fell.  She hit her right side and lower lumbar area on the floor.  Not necessarily with loss of consciousness.  She notes significant pain in the right lower lumbar paraspinal area more so than left there is also some pain in the right posterior and lateral rib area inferiorly.  The pain is worse with going from sitting to standing.  Is hard to getting in bed.  Bending aggravates.  She has no radiating pain in the lower extremities.  Her pain scale may be 8/10.  She is on meloxicam this was increased in dose.  She has had no muscle relaxers, oral steroids, injections.    She was evaluated and plain lumbosacral spine films.  I did discuss with the primary provider's office.  They reviewed the written report to me over the telephone this

## 2025-03-17 ENCOUNTER — TELEPHONE (OUTPATIENT)
Dept: ORTHOPEDIC SURGERY | Age: 80
End: 2025-03-17

## 2025-03-17 NOTE — TELEPHONE ENCOUNTER
Left message with patient, answering she identified with the patient's name.  This study reveals no evidence of rib fracture on the right or any other abnormality on chest x-ray.  Best plans time is to follow along and hopefully will get somewhat better on its own.  Really do not have anything specialized I can offer, though did do some trigger point injections into the right upper lumbar area a few days ago.  Maybe that will offer some palliation.  I told her if she is not better in a week or 2, or worsens, call and let me know and then we decide what to do then.

## 2025-04-21 ENCOUNTER — PROCEDURE VISIT (OUTPATIENT)
Dept: NEUROLOGY | Age: 80
End: 2025-04-21

## 2025-04-21 VITALS — BODY MASS INDEX: 24.59 KG/M2 | HEART RATE: 82 BPM | WEIGHT: 153 LBS | OXYGEN SATURATION: 98 % | HEIGHT: 66 IN

## 2025-04-21 DIAGNOSIS — R20.0 NUMBNESS AND TINGLING IN BOTH HANDS: Primary | ICD-10-CM

## 2025-04-21 DIAGNOSIS — R20.2 NUMBNESS AND TINGLING IN BOTH HANDS: Primary | ICD-10-CM

## 2025-04-21 NOTE — PROGRESS NOTES
EMG/Nerve Conduction Study Procedure Note  [address]    [address]  [place]  [address]   [phone]      Hx:    Exam:     79 y.o.  w female     EMG::   BUE...    Diabetic.  Paresthesia upper EXTR.  Softening thenars and hand mm..  no fascic.   No [place].   No [place]st[place]. Hx  DM++  and RA+.  Paresthesia and hypesthesia limbs -- and hands  ..    Referring:    [facility] Gross  Technologist ::   Fredy Dove  Hgt:      5'7\"        Summary     needle EMG selected Upper extrm mm w CV  studies.                 Controlled environmental factors / EMG lab.  Temperature.   NCV : sensory segments:      Markedly abnormal = ABSENT /no response of the right median SCV SNAP.  Slowed left median SCV with marked attenuation of the SNAP.  Normal bilateral ulnar bilateral radial SCV.      NCV transcarpal sensory segments:       Markedly abnormal right median transcarpal SCV = ABSENT/no response SNAP.  Normal right ulnar transcarpal.  Abnormal slowed left median transcarpal with a peak latency difference of 1.75 ms (UL = 0.20 ms).  NCV Motor MCV segments:       Abnormal = severe prolonged median TL = right median TL 10.71 ms (UL = 4.15 ms) with attenuated CMAP as well as slowed proximal MCV; and superimposed on Tate-Maurisio anastomosis.  Delayed prolonged left median TL 6.00 ms also with severe attenuated CMAP and superimposed on Tate-Maurisio anastomosis.    Normal bilateral ulnar MCV.  F-wave studies:    Abnormal delayed/prolonged bilateral median nerve F waves.  Worse at the right.  Normal bilateral ulnar F waves.        NEEDLE EMG:   Tested muscles::    normal bilateral FDI APB.                  INTERPRETATION:       ELECTROPHYSIOLOGIC EVIDENCE OF SIGNIFICANT TO SEVERE BILATERAL ENTRAPMENT OF THE MEDIAN NERVE AT THE WRIST CARPAL SEGMENTS WORSE ON THE RIGHT SUPERIMPOSED ON BILATERAL TATE-MAURISIO ANASTOMOSIS.  NO AXONAL DENERVATION.          CONCLUSION:       Bilateral severe carpal tunnel syndrome superimposed on

## 2025-06-18 ENCOUNTER — OFFICE VISIT (OUTPATIENT)
Dept: ORTHOPEDIC SURGERY | Age: 80
End: 2025-06-18
Payer: MEDICARE

## 2025-06-18 DIAGNOSIS — M17.11 PRIMARY OSTEOARTHRITIS OF RIGHT KNEE: Primary | ICD-10-CM

## 2025-06-18 DIAGNOSIS — M17.12 PRIMARY OSTEOARTHRITIS OF LEFT KNEE: ICD-10-CM

## 2025-06-18 PROCEDURE — G8420 CALC BMI NORM PARAMETERS: HCPCS | Performed by: ORTHOPAEDIC SURGERY

## 2025-06-18 PROCEDURE — 1036F TOBACCO NON-USER: CPT | Performed by: ORTHOPAEDIC SURGERY

## 2025-06-18 PROCEDURE — 99214 OFFICE O/P EST MOD 30 MIN: CPT | Performed by: ORTHOPAEDIC SURGERY

## 2025-06-18 PROCEDURE — 1123F ACP DISCUSS/DSCN MKR DOCD: CPT | Performed by: ORTHOPAEDIC SURGERY

## 2025-06-18 PROCEDURE — G8428 CUR MEDS NOT DOCUMENT: HCPCS | Performed by: ORTHOPAEDIC SURGERY

## 2025-06-18 PROCEDURE — 1090F PRES/ABSN URINE INCON ASSESS: CPT | Performed by: ORTHOPAEDIC SURGERY

## 2025-06-18 PROCEDURE — G8400 PT W/DXA NO RESULTS DOC: HCPCS | Performed by: ORTHOPAEDIC SURGERY

## 2025-06-18 NOTE — PROGRESS NOTES
Name: Dee Kaur  YOB: 1945  Gender: female  MRN: [id number]    CC: Bilateral knee pain recurrent in nature    HPI: Dee Kaur is a 79 y.o. female who presents with recurrent bilateral knee pain.  We did see her this time last year and she underwent a course of viscosupplementation which she found quite beneficial.  Although she has relatively significant degenerative joint disease of both knees she has extremely fragile skin and was felt to be a relatively poor candidate for elective surgery because of that significant fragility.    Fortunately she did see benefit from viscosupplementation desires to discuss the viability of proceeding with another series.    History was obtained from patient    ROS/Meds/PSH/PMH/FH/SH: I personally reviewed the patients standard intake form.  Below are the pertinents    Tobacco:  reports that she has quit smoking. She has been exposed to tobacco smoke. She has never used smokeless tobacco.  Past Medical History:   Diagnosis Date    Anxiety disorder     Basal cell carcinoma (BCC) of forehead     Colon polyps     COVID-19 12/2021    Fibromyalgia     Gastric ulcer     Hepatitis B     Hypercholesteremia     Multiple thyroid nodules     Type 2 diabetes mellitus (HCC)     Vitamin D deficiency       Past Surgical History:   Procedure Laterality Date    COLONOSCOPY      MOHS SURGERY  11/2019    basal cell carcinoma forehead    UPPER GASTROINTESTINAL ENDOSCOPY          Physical Examination:  Physical exam: On examination of the patient's gait there is there is varus deformity in the bilateral knee    On examination while standing there is decreased flexion in the lumbosacral spine without acute list or spasm.    While seated ,  the Bilateral hip is examined there is full range of motion without obvious issue .     On examination of the  Bilateral knee :ROM is 0 to 125 The knee is in varus which corrects with valgus stress to some degree, There is

## 2025-07-10 DIAGNOSIS — M17.11 PRIMARY OSTEOARTHRITIS OF RIGHT KNEE: ICD-10-CM

## 2025-07-10 DIAGNOSIS — M17.12 PRIMARY OSTEOARTHRITIS OF LEFT KNEE: Primary | ICD-10-CM

## 2025-07-15 ENCOUNTER — TELEPHONE (OUTPATIENT)
Dept: ORTHOPEDIC SURGERY | Age: 80
End: 2025-07-15

## 2025-07-15 NOTE — TELEPHONE ENCOUNTER
RENITAM to let the patient know her gel inj is still pending for auth. I have cancelled her inj appts once the auth has been submitted someone will call to get her scheduled.

## 2025-07-16 ENCOUNTER — TELEPHONE (OUTPATIENT)
Dept: ORTHOPEDIC SURGERY | Age: 80
End: 2025-07-16

## 2025-07-16 ENCOUNTER — OFFICE VISIT (OUTPATIENT)
Dept: ORTHOPEDIC SURGERY | Age: 80
End: 2025-07-16
Payer: MEDICARE

## 2025-07-16 DIAGNOSIS — M17.11 PRIMARY OSTEOARTHRITIS OF RIGHT KNEE: ICD-10-CM

## 2025-07-16 DIAGNOSIS — M17.12 PRIMARY OSTEOARTHRITIS OF LEFT KNEE: Primary | ICD-10-CM

## 2025-07-16 PROCEDURE — 20611 DRAIN/INJ JOINT/BURSA W/US: CPT | Performed by: PHYSICIAN ASSISTANT

## 2025-07-16 RX ORDER — METHYLPREDNISOLONE ACETATE 40 MG/ML
40 INJECTION, SUSPENSION INTRA-ARTICULAR; INTRALESIONAL; INTRAMUSCULAR; SOFT TISSUE ONCE
Status: COMPLETED | OUTPATIENT
Start: 2025-07-16 | End: 2025-07-16

## 2025-07-16 RX ADMIN — METHYLPREDNISOLONE ACETATE 40 MG: 40 INJECTION, SUSPENSION INTRA-ARTICULAR; INTRALESIONAL; INTRAMUSCULAR; SOFT TISSUE at 13:41

## 2025-07-16 RX ADMIN — METHYLPREDNISOLONE ACETATE 40 MG: 40 INJECTION, SUSPENSION INTRA-ARTICULAR; INTRALESIONAL; INTRAMUSCULAR; SOFT TISSUE at 13:42

## 2025-07-16 NOTE — PROGRESS NOTES
Name: Dee Kaur  YOB: 1945  Gender: female  MRN: [id number]        Current Outpatient Medications:     metoprolol succinate (TOPROL XL) 25 MG extended release tablet, Take 1 tablet by mouth daily, Disp: 90 tablet, Rfl: 1    torsemide (DEMADEX) 5 MG tablet, Take 1 tablet by mouth daily as needed (for swelling), Disp: 30 tablet, Rfl: 3    glipiZIDE (GLUCOTROL) 10 MG tablet, Take 1 tablet by mouth daily, Disp: , Rfl:     ketorolac (ACULAR) 0.5 % ophthalmic solution, , Disp: , Rfl:     OZEMPIC, 0.25 OR 0.5 MG/DOSE, 2 MG/3ML SOPN, , Disp: , Rfl:     RIGHTEST  GLUCOSE TEST strip, USE AS DIRECTED ONE TIME DAILY, Disp: , Rfl:     DULoxetine (CYMBALTA) 20 MG extended release capsule, Take 1 capsule by mouth daily, Disp: , Rfl:     metFORMIN (GLUCOPHAGE) 500 MG tablet, Take 1 tablet by mouth 3 times daily, Disp: , Rfl:     zolpidem (AMBIEN) 10 MG tablet, 0.5 tablets nightly as needed., Disp: , Rfl:     PX Lancets Ultra Thin 28G MISC, USE TO CHECK BLOOD SUGAR ONE TIME DAILY, Disp: , Rfl:     omeprazole (PRILOSEC) 20 MG delayed release capsule, Take 1 capsule by mouth Daily, Disp: , Rfl:     rosuvastatin (CRESTOR) 10 MG tablet, Take 1 tablet by mouth every other day, Disp: , Rfl:   Allergies   Allergen Reactions    Gabapentin Other (See Comments)     Jerking  Other reaction(s): Other (see comments)  Jerking    Tizanidine Other (See Comments)     Neck jerks  Other reaction(s): Other- (not listed) - Allergy  Neck jerks  Other reaction(s): Other (see comments)  Neck jerks    Neck jerks Other reaction(s): Other- (not listed) - Allergy Neck jerks       CC: BILATERALknee pain    Patient scheduled for Euflexxa injections today for both knees.  However she does note quite severe pain and swelling over both knees.  She has had good relief with cortisone injection of the past but has been quite sometime since she has received any cortisone injections.  We ultimately did decide to proceed with cortisone

## 2025-07-16 NOTE — TELEPHONE ENCOUNTER
She has been waiting to get a gel inj in both knees but the right is worse. She got a text last night and she wants to follow up on the status. Please let her know. She has an appt today at 1:00.

## 2025-07-23 ENCOUNTER — OFFICE VISIT (OUTPATIENT)
Dept: ORTHOPEDIC SURGERY | Age: 80
End: 2025-07-23
Payer: MEDICARE

## 2025-07-23 DIAGNOSIS — M17.12 PRIMARY OSTEOARTHRITIS OF LEFT KNEE: Primary | ICD-10-CM

## 2025-07-23 DIAGNOSIS — M17.11 PRIMARY OSTEOARTHRITIS OF RIGHT KNEE: ICD-10-CM

## 2025-07-23 PROCEDURE — 20611 DRAIN/INJ JOINT/BURSA W/US: CPT | Performed by: PHYSICIAN ASSISTANT

## 2025-07-23 NOTE — PROGRESS NOTES
Name: Dee Kaur  YOB: 1945  Gender: female  MRN: [id number]     CC: BILATERAL Knee Osteoarthritis      PROCEDURE: #2 of 3 Hyaluronic Injection    The patient's name and date of birth were confirmed prior to the injection.  The injection site was also confirmed with the patient prior to the procedure. After discussion of risks and benefits including but not limited to pain, infection, skin discoloration, and injury to blood vessels or nerves the patient verbally consented to proceed with injection of the BILATERAL.  The patient is to restrict their activity for 48 hours.    Radiology Report: US guidance was used to examine the joint, ensure adequate needle placement and to decrease the risk of joint aggravation. The intracondylar notch, retropatellar fat pad, patella tendon, patella and tibia were all visualized. Pre and post injection US still images were obtained and placed in the record. Image were obtained with a [employer] ultrasound transducer (model [id number]).    Procedure Note: After steriley prepping the BILATERAL knee, it was injected with a 2mL of Euflexxa and the medication was observed going into the intra-articular space via US guidance.    The patient tolerated the procedure without difficulty.    MATTHEW Kidd

## 2025-07-30 ENCOUNTER — OFFICE VISIT (OUTPATIENT)
Dept: ORTHOPEDIC SURGERY | Age: 80
End: 2025-07-30

## 2025-07-30 DIAGNOSIS — M17.12 PRIMARY OSTEOARTHRITIS OF LEFT KNEE: Primary | ICD-10-CM

## 2025-07-30 DIAGNOSIS — M17.11 PRIMARY OSTEOARTHRITIS OF RIGHT KNEE: ICD-10-CM

## 2025-07-30 NOTE — PROGRESS NOTES
Name: Dee Kaur  YOB: 1945  Gender: female  MRN: [id number]     CC: BILATERAL Knee Osteoarthritis      PROCEDURE: #3 of 3 Hyaluronic Injection    The patient's name and date of birth were confirmed prior to the injection.  The injection site was also confirmed with the patient prior to the procedure. After discussion of risks and benefits including but not limited to pain, infection, skin discoloration, and injury to blood vessels or nerves the patient verbally consented to proceed with injection of the BILATERAL.  The patient is to restrict their activity for 48 hours.    Radiology Report: US guidance was used to examine the joint, ensure adequate needle placement and to decrease the risk of joint aggravation. The intracondylar notch, retropatellar fat pad, patella tendon, patella and tibia were all visualized. Pre and post injection US still images were obtained and placed in the record. Image were obtained with a [employer] ultrasound transducer (model [id number]).    Procedure Note: After steriley prepping the BILATERAL knee, it was injected with a 2mL of Euflexxa and the medication was observed going into the intra-articular space via US guidance.    The patient tolerated the procedure without difficulty.    MATTHEW Kidd